# Patient Record
Sex: FEMALE | Race: WHITE | NOT HISPANIC OR LATINO
[De-identification: names, ages, dates, MRNs, and addresses within clinical notes are randomized per-mention and may not be internally consistent; named-entity substitution may affect disease eponyms.]

---

## 2017-05-02 ENCOUNTER — APPOINTMENT (OUTPATIENT)
Dept: VASCULAR SURGERY | Facility: CLINIC | Age: 37
End: 2017-05-02

## 2017-05-02 PROBLEM — Z00.00 ENCOUNTER FOR PREVENTIVE HEALTH EXAMINATION: Status: ACTIVE | Noted: 2017-05-02

## 2017-05-18 ENCOUNTER — RESULT REVIEW (OUTPATIENT)
Age: 37
End: 2017-05-18

## 2017-05-18 ENCOUNTER — APPOINTMENT (OUTPATIENT)
Dept: VASCULAR SURGERY | Facility: CLINIC | Age: 37
End: 2017-05-18

## 2017-06-02 ENCOUNTER — APPOINTMENT (OUTPATIENT)
Dept: VASCULAR SURGERY | Facility: CLINIC | Age: 37
End: 2017-06-02

## 2017-07-17 ENCOUNTER — APPOINTMENT (OUTPATIENT)
Dept: VASCULAR SURGERY | Facility: CLINIC | Age: 37
End: 2017-07-17

## 2017-11-29 ENCOUNTER — RECORD ABSTRACTING (OUTPATIENT)
Age: 37
End: 2017-11-29

## 2017-11-29 DIAGNOSIS — R25.2 CRAMP AND SPASM: ICD-10-CM

## 2017-11-29 DIAGNOSIS — I78.1 NEVUS, NON-NEOPLASTIC: ICD-10-CM

## 2017-11-29 DIAGNOSIS — M79.604 PAIN IN RIGHT LEG: ICD-10-CM

## 2017-11-29 DIAGNOSIS — I83.11 VARICOSE VEINS OF RIGHT LOWER EXTREMITY WITH INFLAMMATION: ICD-10-CM

## 2017-11-29 DIAGNOSIS — I87.2 VENOUS INSUFFICIENCY (CHRONIC) (PERIPHERAL): ICD-10-CM

## 2017-11-29 DIAGNOSIS — Z82.49 FAMILY HISTORY OF ISCHEMIC HEART DISEASE AND OTHER DISEASES OF THE CIRCULATORY SYSTEM: ICD-10-CM

## 2017-11-29 DIAGNOSIS — Z83.79 FAMILY HISTORY OF OTHER DISEASES OF THE DIGESTIVE SYSTEM: ICD-10-CM

## 2017-11-29 DIAGNOSIS — Z80.0 FAMILY HISTORY OF MALIGNANT NEOPLASM OF DIGESTIVE ORGANS: ICD-10-CM

## 2017-11-29 DIAGNOSIS — M79.89 OTHER SPECIFIED SOFT TISSUE DISORDERS: ICD-10-CM

## 2017-11-29 DIAGNOSIS — Z87.891 PERSONAL HISTORY OF NICOTINE DEPENDENCE: ICD-10-CM

## 2018-04-11 ENCOUNTER — APPOINTMENT (OUTPATIENT)
Dept: VASCULAR SURGERY | Facility: CLINIC | Age: 38
End: 2018-04-11

## 2018-07-23 PROBLEM — Z80.0 FAMILY HISTORY OF COLON CANCER: Status: ACTIVE | Noted: 2017-11-29

## 2018-07-23 PROBLEM — I87.2 VENOUS INSUFFICIENCY: Status: ACTIVE | Noted: 2017-11-29

## 2019-04-17 ENCOUNTER — EMERGENCY (EMERGENCY)
Facility: HOSPITAL | Age: 39
LOS: 1 days | Discharge: HOME | End: 2019-04-17
Attending: EMERGENCY MEDICINE | Admitting: EMERGENCY MEDICINE
Payer: MEDICAID

## 2019-04-17 VITALS
RESPIRATION RATE: 18 BRPM | SYSTOLIC BLOOD PRESSURE: 125 MMHG | WEIGHT: 208.78 LBS | OXYGEN SATURATION: 100 % | TEMPERATURE: 98 F | DIASTOLIC BLOOD PRESSURE: 88 MMHG | HEART RATE: 76 BPM | HEIGHT: 65 IN

## 2019-04-17 VITALS
HEART RATE: 56 BPM | TEMPERATURE: 98 F | OXYGEN SATURATION: 100 % | RESPIRATION RATE: 18 BRPM | DIASTOLIC BLOOD PRESSURE: 75 MMHG | SYSTOLIC BLOOD PRESSURE: 110 MMHG

## 2019-04-17 DIAGNOSIS — M54.5 LOW BACK PAIN: ICD-10-CM

## 2019-04-17 DIAGNOSIS — Z88.0 ALLERGY STATUS TO PENICILLIN: ICD-10-CM

## 2019-04-17 LAB
ALBUMIN SERPL ELPH-MCNC: 3.8 G/DL — SIGNIFICANT CHANGE UP (ref 3.3–5)
ALP SERPL-CCNC: SIGNIFICANT CHANGE UP U/L (ref 40–120)
ALT FLD-CCNC: SIGNIFICANT CHANGE UP U/L (ref 10–45)
ANION GAP SERPL CALC-SCNC: 10 MMOL/L — SIGNIFICANT CHANGE UP (ref 5–17)
APPEARANCE UR: CLEAR — SIGNIFICANT CHANGE UP
APTT BLD: 33.8 SEC — SIGNIFICANT CHANGE UP (ref 27.5–36.3)
AST SERPL-CCNC: SIGNIFICANT CHANGE UP U/L (ref 10–40)
BASOPHILS # BLD AUTO: 0.04 K/UL — SIGNIFICANT CHANGE UP (ref 0–0.2)
BASOPHILS NFR BLD AUTO: 0.6 % — SIGNIFICANT CHANGE UP (ref 0–2)
BILIRUB SERPL-MCNC: 0.3 MG/DL — SIGNIFICANT CHANGE UP (ref 0.2–1.2)
BILIRUB UR-MCNC: NEGATIVE — SIGNIFICANT CHANGE UP
BUN SERPL-MCNC: 9 MG/DL — SIGNIFICANT CHANGE UP (ref 7–23)
CALCIUM SERPL-MCNC: 9.2 MG/DL — SIGNIFICANT CHANGE UP (ref 8.4–10.5)
CHLORIDE SERPL-SCNC: 104 MMOL/L — SIGNIFICANT CHANGE UP (ref 96–108)
CK MB CFR SERPL CALC: <1 NG/ML — SIGNIFICANT CHANGE UP (ref 0–6.7)
CO2 SERPL-SCNC: 22 MMOL/L — SIGNIFICANT CHANGE UP (ref 22–31)
COLOR SPEC: YELLOW — SIGNIFICANT CHANGE UP
CREAT SERPL-MCNC: 0.63 MG/DL — SIGNIFICANT CHANGE UP (ref 0.5–1.3)
DIFF PNL FLD: NEGATIVE — SIGNIFICANT CHANGE UP
EOSINOPHIL # BLD AUTO: 0.15 K/UL — SIGNIFICANT CHANGE UP (ref 0–0.5)
EOSINOPHIL NFR BLD AUTO: 2.1 % — SIGNIFICANT CHANGE UP (ref 0–6)
GLUCOSE SERPL-MCNC: 99 MG/DL — SIGNIFICANT CHANGE UP (ref 70–99)
GLUCOSE UR QL: NEGATIVE — SIGNIFICANT CHANGE UP
HCT VFR BLD CALC: 46.2 % — HIGH (ref 34.5–45)
HGB BLD-MCNC: 14.5 G/DL — SIGNIFICANT CHANGE UP (ref 11.5–15.5)
IMM GRANULOCYTES NFR BLD AUTO: 0.3 % — SIGNIFICANT CHANGE UP (ref 0–1.5)
INR BLD: 1.06 — SIGNIFICANT CHANGE UP (ref 0.88–1.16)
KETONES UR-MCNC: NEGATIVE — SIGNIFICANT CHANGE UP
LEUKOCYTE ESTERASE UR-ACNC: NEGATIVE — SIGNIFICANT CHANGE UP
LYMPHOCYTES # BLD AUTO: 2.31 K/UL — SIGNIFICANT CHANGE UP (ref 1–3.3)
LYMPHOCYTES # BLD AUTO: 33 % — SIGNIFICANT CHANGE UP (ref 13–44)
MCHC RBC-ENTMCNC: 26.1 PG — LOW (ref 27–34)
MCHC RBC-ENTMCNC: 31.4 GM/DL — LOW (ref 32–36)
MCV RBC AUTO: 83.2 FL — SIGNIFICANT CHANGE UP (ref 80–100)
MONOCYTES # BLD AUTO: 0.52 K/UL — SIGNIFICANT CHANGE UP (ref 0–0.9)
MONOCYTES NFR BLD AUTO: 7.4 % — SIGNIFICANT CHANGE UP (ref 2–14)
NEUTROPHILS # BLD AUTO: 3.97 K/UL — SIGNIFICANT CHANGE UP (ref 1.8–7.4)
NEUTROPHILS NFR BLD AUTO: 56.6 % — SIGNIFICANT CHANGE UP (ref 43–77)
NITRITE UR-MCNC: NEGATIVE — SIGNIFICANT CHANGE UP
NRBC # BLD: 0 /100 WBCS — SIGNIFICANT CHANGE UP (ref 0–0)
PH UR: 6.5 — SIGNIFICANT CHANGE UP (ref 5–8)
PLATELET # BLD AUTO: 235 K/UL — SIGNIFICANT CHANGE UP (ref 150–400)
POTASSIUM SERPL-MCNC: SIGNIFICANT CHANGE UP MMOL/L (ref 3.5–5.3)
POTASSIUM SERPL-SCNC: SIGNIFICANT CHANGE UP MMOL/L (ref 3.5–5.3)
PROT SERPL-MCNC: 7.6 G/DL — SIGNIFICANT CHANGE UP (ref 6–8.3)
PROT UR-MCNC: NEGATIVE MG/DL — SIGNIFICANT CHANGE UP
PROTHROM AB SERPL-ACNC: 12 SEC — SIGNIFICANT CHANGE UP (ref 10–12.9)
RBC # BLD: 5.55 M/UL — HIGH (ref 3.8–5.2)
RBC # FLD: 13.2 % — SIGNIFICANT CHANGE UP (ref 10.3–14.5)
SODIUM SERPL-SCNC: 136 MMOL/L — SIGNIFICANT CHANGE UP (ref 135–145)
SP GR SPEC: 1.01 — SIGNIFICANT CHANGE UP (ref 1–1.03)
TROPONIN T SERPL-MCNC: <0.01 NG/ML — SIGNIFICANT CHANGE UP (ref 0–0.01)
UROBILINOGEN FLD QL: 0.2 E.U./DL — SIGNIFICANT CHANGE UP
WBC # BLD: 7.01 K/UL — SIGNIFICANT CHANGE UP (ref 3.8–10.5)
WBC # FLD AUTO: 7.01 K/UL — SIGNIFICANT CHANGE UP (ref 3.8–10.5)

## 2019-04-17 PROCEDURE — 85025 COMPLETE CBC W/AUTO DIFF WBC: CPT

## 2019-04-17 PROCEDURE — 81003 URINALYSIS AUTO W/O SCOPE: CPT

## 2019-04-17 PROCEDURE — 85610 PROTHROMBIN TIME: CPT

## 2019-04-17 PROCEDURE — 87086 URINE CULTURE/COLONY COUNT: CPT

## 2019-04-17 PROCEDURE — 80053 COMPREHEN METABOLIC PANEL: CPT

## 2019-04-17 PROCEDURE — 82550 ASSAY OF CK (CPK): CPT

## 2019-04-17 PROCEDURE — 85730 THROMBOPLASTIN TIME PARTIAL: CPT

## 2019-04-17 PROCEDURE — 96372 THER/PROPH/DIAG INJ SC/IM: CPT

## 2019-04-17 PROCEDURE — 93010 ELECTROCARDIOGRAM REPORT: CPT

## 2019-04-17 PROCEDURE — 71046 X-RAY EXAM CHEST 2 VIEWS: CPT

## 2019-04-17 PROCEDURE — 99284 EMERGENCY DEPT VISIT MOD MDM: CPT | Mod: 25

## 2019-04-17 PROCEDURE — 82553 CREATINE MB FRACTION: CPT

## 2019-04-17 PROCEDURE — 93005 ELECTROCARDIOGRAM TRACING: CPT

## 2019-04-17 PROCEDURE — 84484 ASSAY OF TROPONIN QUANT: CPT

## 2019-04-17 PROCEDURE — 99285 EMERGENCY DEPT VISIT HI MDM: CPT | Mod: 25

## 2019-04-17 PROCEDURE — 71046 X-RAY EXAM CHEST 2 VIEWS: CPT | Mod: 26

## 2019-04-17 RX ORDER — DIAZEPAM 5 MG
5 TABLET ORAL ONCE
Qty: 0 | Refills: 0 | Status: DISCONTINUED | OUTPATIENT
Start: 2019-04-17 | End: 2019-04-17

## 2019-04-17 RX ORDER — KETOROLAC TROMETHAMINE 30 MG/ML
30 SYRINGE (ML) INJECTION ONCE
Qty: 0 | Refills: 0 | Status: DISCONTINUED | OUTPATIENT
Start: 2019-04-17 | End: 2019-04-17

## 2019-04-17 RX ORDER — METHOCARBAMOL 500 MG/1
2 TABLET, FILM COATED ORAL
Qty: 24 | Refills: 0
Start: 2019-04-17 | End: 2019-04-19

## 2019-04-17 RX ORDER — IBUPROFEN 200 MG
1 TABLET ORAL
Qty: 15 | Refills: 0
Start: 2019-04-17 | End: 2019-04-21

## 2019-04-17 RX ORDER — LIDOCAINE 4 G/100G
2 CREAM TOPICAL ONCE
Qty: 0 | Refills: 0 | Status: COMPLETED | OUTPATIENT
Start: 2019-04-17 | End: 2019-04-17

## 2019-04-17 RX ADMIN — Medication 30 MILLIGRAM(S): at 10:55

## 2019-04-17 RX ADMIN — Medication 5 MILLIGRAM(S): at 10:55

## 2019-04-17 NOTE — ED PROVIDER NOTE - ATTENDING CONTRIBUTION TO CARE
37 yo female no pmh c/o back pain upper back radiating to neck and scapular area x 8 d, worse w movement and laying on R side, notes numbness in RUE when she lays in bed at night that improves if she doesn't sleep on R side.  Pt denies other numbness or weakness, cp.  Pt also felt LLQ pain and hematuria 3 d ago that resolved - no longer present.  No h/o stones, abnl discharge, dysuria w sx.  Pt took old clindamycin when she had the LLQ pain.   No trauma, cp, palpitations, sweats, sob, n/v, dizziness, cough, fever, vaginal bleeding, discharge. Pt is sexually active with 1 partner.  No h/o std.  Well appearing, nad, nc/at, lung cta, heart reg, abd soft, nt, neck/back w ttp t2/3 area w/o step off or fluctuance, also mild ttp R trapezius/rhomboid area, ext no gross deformity, radial and dp 1+ bilat, awake, alert, oriented x 3, CN II-XII grossly intact, motor 5/5, no gross sens deficits, gait steady, no ataxia, speech clear.  Pt w resolved abd pain and hematuria - ua nl here, abd nontender; ? passed stone - discussed abd pain precautions.  Pt also c/o back pain w/o neuro deficits.  No fever, sig ttp or neuro finding on exam to suggest epidural abscess.  ? radiculopathy.  Pt felt improved w pain meds in ed.  Plan dc to fu pmd and outpt mri c/t spine, discussed reasons for return w pt for worsening neuro sx, fever, any other concerns.

## 2019-04-17 NOTE — ED PROVIDER NOTE - CARE PROVIDER_API CALL
Jimmy Mccurdy)  Obstetrics and Gynecology  [address] [address]  Phone: [phone]  Fax: [phone]  Follow Up Time:     Amos Sweeney)  Medicine  [address], [address]  [place] [address]  Phone: [phone]  Fax: [phone]  Follow Up Time:     Brian Irene)  Med Specialties [address]  [address] [place] [address]  Phone: [phone]  Fax: [phone]  Follow Up Time:

## 2019-04-17 NOTE — ED PROVIDER NOTE - PHYSICAL EXAMINATION
CONSTITUTIONAL: Well-appearing; well-nourished; in no apparent distress.   HEAD: Normocephalic; atraumatic.   EYES: PERRL; EOM intact; conjunctiva and sclera clear  ENT: normal nose; no rhinorrhea; normal pharynx with no erythema or lesions.   NECK: Supple; +tenderness to R cervical, thoracic and lumbar paraspinal muscles. Pt holding the R side of her neck.   CARDIOVASCULAR: Normal S1, S2; no murmurs, rubs, or gallops. Regular rate and rhythm.   RESPIRATORY: Breathing easily; breath sounds clear and equal bilaterally; no wheezes, rhonchi, or rales.  GI: Soft; non-distended; non-tender; no palpable organomegaly.   : no cmt or adnexal tenderness   MSK: FROM at all extremities, normal tone   EXT: No cyanosis or edema; N/V intact  SKIN: Normal for age and race; warm; dry; good turgor; no apparent lesions or rash.   NEURO: A & O x 3; face symmetric; grossly unremarkable.   PSYCHOLOGICAL: The patient’s mood and manner are appropriate.

## 2019-04-17 NOTE — ED PROVIDER NOTE - CARE PROVIDERS DIRECT ADDRESSES
[email or web address]

## 2019-04-17 NOTE — ED ADULT TRIAGE NOTE - CHIEF COMPLAINT QUOTE
Patient c/o upper back pain radiating to chest for 10 days got worse last night .  Also c/o blood in the urine , vaginal pressure when urinating , fever and chills since last night .

## 2019-04-17 NOTE — ED PROVIDER NOTE - PROVIDER TOKENS
PROVIDER:[TOKEN:[[id number]]],PROVIDER:[TOKEN:[[id number]]],PROVIDER:[TOKEN:[[id number]]]

## 2019-04-18 LAB
CULTURE RESULTS: SIGNIFICANT CHANGE UP
SPECIMEN SOURCE: SIGNIFICANT CHANGE UP

## 2019-08-17 ENCOUNTER — EMERGENCY (EMERGENCY)
Facility: HOSPITAL | Age: 39
LOS: 1 days | Discharge: HOME | End: 2019-08-17
Attending: EMERGENCY MEDICINE | Admitting: EMERGENCY MEDICINE
Payer: MEDICAID

## 2019-08-17 ENCOUNTER — INBOUND DOCUMENT (OUTPATIENT)
Age: 39
End: 2019-08-17

## 2019-08-17 VITALS
OXYGEN SATURATION: 99 % | TEMPERATURE: 99 F | HEIGHT: 66 IN | RESPIRATION RATE: 18 BRPM | DIASTOLIC BLOOD PRESSURE: 85 MMHG | SYSTOLIC BLOOD PRESSURE: 128 MMHG | WEIGHT: 203.93 LBS | HEART RATE: 82 BPM

## 2019-08-17 LAB
ALBUMIN SERPL ELPH-MCNC: 4 G/DL — SIGNIFICANT CHANGE UP (ref 3.3–5)
ALP SERPL-CCNC: 52 U/L — SIGNIFICANT CHANGE UP (ref 40–120)
ALT FLD-CCNC: 9 U/L — LOW (ref 10–45)
ANION GAP SERPL CALC-SCNC: 11 MMOL/L — SIGNIFICANT CHANGE UP (ref 5–17)
APTT BLD: 33.6 SEC — SIGNIFICANT CHANGE UP (ref 27.5–36.3)
AST SERPL-CCNC: 12 U/L — SIGNIFICANT CHANGE UP (ref 10–40)
BASOPHILS # BLD AUTO: 0.05 K/UL — SIGNIFICANT CHANGE UP (ref 0–0.2)
BASOPHILS NFR BLD AUTO: 0.6 % — SIGNIFICANT CHANGE UP (ref 0–2)
BILIRUB SERPL-MCNC: 0.3 MG/DL — SIGNIFICANT CHANGE UP (ref 0.2–1.2)
BUN SERPL-MCNC: 9 MG/DL — SIGNIFICANT CHANGE UP (ref 7–23)
CALCIUM SERPL-MCNC: 9.5 MG/DL — SIGNIFICANT CHANGE UP (ref 8.4–10.5)
CHLORIDE SERPL-SCNC: 105 MMOL/L — SIGNIFICANT CHANGE UP (ref 96–108)
CK MB CFR SERPL CALC: <1 NG/ML — SIGNIFICANT CHANGE UP (ref 0–6.7)
CO2 SERPL-SCNC: 24 MMOL/L — SIGNIFICANT CHANGE UP (ref 22–31)
CREAT SERPL-MCNC: 0.74 MG/DL — SIGNIFICANT CHANGE UP (ref 0.5–1.3)
EOSINOPHIL # BLD AUTO: 0.19 K/UL — SIGNIFICANT CHANGE UP (ref 0–0.5)
EOSINOPHIL NFR BLD AUTO: 2.4 % — SIGNIFICANT CHANGE UP (ref 0–6)
GLUCOSE SERPL-MCNC: 103 MG/DL — HIGH (ref 70–99)
HCT VFR BLD CALC: 44.8 % — SIGNIFICANT CHANGE UP (ref 34.5–45)
HGB BLD-MCNC: 14.1 G/DL — SIGNIFICANT CHANGE UP (ref 11.5–15.5)
IMM GRANULOCYTES NFR BLD AUTO: 0.5 % — SIGNIFICANT CHANGE UP (ref 0–1.5)
INR BLD: 1.12 — SIGNIFICANT CHANGE UP (ref 0.88–1.16)
LYMPHOCYTES # BLD AUTO: 2.85 K/UL — SIGNIFICANT CHANGE UP (ref 1–3.3)
LYMPHOCYTES # BLD AUTO: 36.4 % — SIGNIFICANT CHANGE UP (ref 13–44)
MCHC RBC-ENTMCNC: 26 PG — LOW (ref 27–34)
MCHC RBC-ENTMCNC: 31.5 GM/DL — LOW (ref 32–36)
MCV RBC AUTO: 82.5 FL — SIGNIFICANT CHANGE UP (ref 80–100)
MONOCYTES # BLD AUTO: 0.49 K/UL — SIGNIFICANT CHANGE UP (ref 0–0.9)
MONOCYTES NFR BLD AUTO: 6.3 % — SIGNIFICANT CHANGE UP (ref 2–14)
NEUTROPHILS # BLD AUTO: 4.21 K/UL — SIGNIFICANT CHANGE UP (ref 1.8–7.4)
NEUTROPHILS NFR BLD AUTO: 53.8 % — SIGNIFICANT CHANGE UP (ref 43–77)
NRBC # BLD: 0 /100 WBCS — SIGNIFICANT CHANGE UP (ref 0–0)
PLATELET # BLD AUTO: 246 K/UL — SIGNIFICANT CHANGE UP (ref 150–400)
POTASSIUM SERPL-MCNC: 3.9 MMOL/L — SIGNIFICANT CHANGE UP (ref 3.5–5.3)
POTASSIUM SERPL-SCNC: 3.9 MMOL/L — SIGNIFICANT CHANGE UP (ref 3.5–5.3)
PROT SERPL-MCNC: 7.5 G/DL — SIGNIFICANT CHANGE UP (ref 6–8.3)
PROTHROM AB SERPL-ACNC: 12.7 SEC — SIGNIFICANT CHANGE UP (ref 10–12.9)
RBC # BLD: 5.43 M/UL — HIGH (ref 3.8–5.2)
RBC # FLD: 13.2 % — SIGNIFICANT CHANGE UP (ref 10.3–14.5)
SODIUM SERPL-SCNC: 140 MMOL/L — SIGNIFICANT CHANGE UP (ref 135–145)
TROPONIN T SERPL-MCNC: <0.01 NG/ML — SIGNIFICANT CHANGE UP (ref 0–0.01)
WBC # BLD: 7.83 K/UL — SIGNIFICANT CHANGE UP (ref 3.8–10.5)
WBC # FLD AUTO: 7.83 K/UL — SIGNIFICANT CHANGE UP (ref 3.8–10.5)

## 2019-08-17 PROCEDURE — 71046 X-RAY EXAM CHEST 2 VIEWS: CPT | Mod: 26

## 2019-08-17 PROCEDURE — 85610 PROTHROMBIN TIME: CPT

## 2019-08-17 PROCEDURE — 71046 X-RAY EXAM CHEST 2 VIEWS: CPT

## 2019-08-17 PROCEDURE — 36415 COLL VENOUS BLD VENIPUNCTURE: CPT

## 2019-08-17 PROCEDURE — 84484 ASSAY OF TROPONIN QUANT: CPT

## 2019-08-17 PROCEDURE — 96374 THER/PROPH/DIAG INJ IV PUSH: CPT

## 2019-08-17 PROCEDURE — 99285 EMERGENCY DEPT VISIT HI MDM: CPT | Mod: 25

## 2019-08-17 PROCEDURE — 80053 COMPREHEN METABOLIC PANEL: CPT

## 2019-08-17 PROCEDURE — 85025 COMPLETE CBC W/AUTO DIFF WBC: CPT

## 2019-08-17 PROCEDURE — 96375 TX/PRO/DX INJ NEW DRUG ADDON: CPT

## 2019-08-17 PROCEDURE — 82550 ASSAY OF CK (CPK): CPT

## 2019-08-17 PROCEDURE — 82553 CREATINE MB FRACTION: CPT

## 2019-08-17 PROCEDURE — 99284 EMERGENCY DEPT VISIT MOD MDM: CPT | Mod: 25

## 2019-08-17 PROCEDURE — 85730 THROMBOPLASTIN TIME PARTIAL: CPT

## 2019-08-17 RX ORDER — FAMOTIDINE 10 MG/ML
20 INJECTION INTRAVENOUS ONCE
Refills: 0 | Status: COMPLETED | OUTPATIENT
Start: 2019-08-17 | End: 2019-08-17

## 2019-08-17 RX ORDER — ACETAMINOPHEN 500 MG
650 TABLET ORAL ONCE
Refills: 0 | Status: COMPLETED | OUTPATIENT
Start: 2019-08-17 | End: 2019-08-17

## 2019-08-17 RX ORDER — METOCLOPRAMIDE HCL 10 MG
10 TABLET ORAL ONCE
Refills: 0 | Status: COMPLETED | OUTPATIENT
Start: 2019-08-17 | End: 2019-08-17

## 2019-08-17 RX ADMIN — Medication 104 MILLIGRAM(S): at 17:35

## 2019-08-17 RX ADMIN — FAMOTIDINE 20 MILLIGRAM(S): 10 INJECTION INTRAVENOUS at 17:35

## 2019-08-17 RX ADMIN — Medication 650 MILLIGRAM(S): at 17:35

## 2019-08-17 NOTE — ED PROVIDER NOTE - DIAGNOSTIC INTERPRETATION
ER PA: Lucina Medrano, PAC  CHEST XRAY INTERPRETATION: lungs clear, heart shadow normal, bony structures intact

## 2019-08-17 NOTE — ED PROVIDER NOTE - PHYSICAL EXAMINATION
CONSTITUTIONAL: Well-appearing; well-nourished; in no apparent distress.   HEAD: Normocephalic; atraumatic.   EYES: PERRL; EOM intact; conjunctiva and sclera clear  ENT: normal nose; no rhinorrhea; normal pharynx with no erythema or lesions.   NECK: Supple; non-tender; no LAD  CARDIOVASCULAR: Normal S1, S2; no murmurs, rubs, or gallops. Regular rate and rhythm. Tenderness to palpations to right of chest wall.  RESPIRATORY: Breathing easily; breath sounds clear and equal bilaterally; no wheezes, rhonchi, or rales.  GI: Soft; non-distended; non-tender; no palpable organomegaly.   MSK: FROM at all extremities, normal tone   EXT: No cyanosis or edema; N/V intact  SKIN: Normal for age and race; warm; dry; good turgor; no apparent lesions or rash.   NEURO: A & O x 3; face symmetric; grossly unremarkable.   PSYCHOLOGICAL: The patient’s mood and manner are appropriate.

## 2019-08-17 NOTE — ED PROVIDER NOTE - CLINICAL SUMMARY MEDICAL DECISION MAKING FREE TEXT BOX
38 y/o F smoker with PMHx of asthma, hypothyroidism, GERD, migraines, and positive family history of CA presents to the ED with complaints of intermittent right sided chest pain x3 days, with associated lightheadedness, SOB and nausea. Denies vomiting or any focal weaknesses. Vital signs are stable and pt is well-appearing. EKG shows NSR with no ischemic changes. Neck is supple, neurologically intact. Heart is regular rate and rhythm. Lungs are clear to ascultation b/l. Chest pain is reproducible with palpation to right chest wall. Given risk factors, will get 1 set of Troponins given pain has been going on for 3 days. Will treat headache. If normal, refer pt to cardiology for prompt f/o next week.

## 2019-08-17 NOTE — ED ADULT TRIAGE NOTE - CHIEF COMPLAINT QUOTE
4 days migraine HA and lightheadedness, onset of R sided CP worse with movement and SOB last night, also c/o "acid reflux" x1 month; no f/c, no vomiting

## 2019-08-17 NOTE — ED PROVIDER NOTE - ATTENDING CONTRIBUTION TO CARE
right parasternal anterior chest wall pain.  suspect costochondritis.  per neg for pe.  ecg non ischemic.  trop neg.  cxr neg.  plan nsaids.

## 2019-08-17 NOTE — ED ADULT NURSE NOTE - OBJECTIVE STATEMENT
Pt to ER w/ report of hx of acid reflux but worsening discomfort for past week, w/ R-sided chest discomfort worse with movement, sob, and HA.  Pt denies f/c/v.  Breathing unlabored, skin warm and dry. 12 lead ekg done and shown to ER attending physician.  Will continue to monitor.

## 2019-08-17 NOTE — ED PROVIDER NOTE - CARE PROVIDER_API CALL
Judith Reyna)  Cardiovascular Disease; Internal Medicine  [address] [id number]  Phone: [phone]  Fax: [phone]  Follow Up Time:

## 2019-08-17 NOTE — ED ADULT TRIAGE NOTE - OTHER COMPLAINTS
Hx asthma and thyroid disease, states "I haven't been on medications in 3 years but I also haven't had it checked"

## 2019-08-17 NOTE — ED PROVIDER NOTE - CHPI ED SYMPTOMS NEG
no chills/no vomiting/no palpitations, no swelling in legs, no numbness, no tingling, no visual changes/no fever/no cough

## 2019-08-17 NOTE — ED PROVIDER NOTE - NS ED ROS FT
Constitutional: no fever, chills  Cardiac: right sided chest   Resp: SOB  GI: nausea, GERD  Neuro: lightheadedness, headaches  All other ROS neg except as per HPI

## 2019-08-17 NOTE — ED PROVIDER NOTE - OBJECTIVE STATEMENT
38 y/o F with PMHx of asthma, hypothyroidism, GERD and migraines presents to the ED with complaints of right sided chest pain x3 days. Pt states that pain was initially intermittent, but has become constant over the past 2 days. The pain is described as cramping and pressure-like, aggravated with movement. Pt notes associated SOB, lightheadedness, and nausea. Denies fever, chills, sweats, cough, palpitations, vomiting, and swelling in legs.   Pt also has complaints of intermittent frontal headaches x3 days, gradual in onset and improved with Tylenol. Pt states that symptoms are similar to previous headaches in the past. Denies numbness, tingling, visual changes, eye tearing, focal weakness or trauma. Pt admits that she is a smoker, and has a strong family hx of cardiac disease. Pt's mother had a MI at 46, and father had a MI at 52. However, pt has never had a cardiac workup of her own.  Pt also has complaints regarding her GERD. Pt states that she has had a burning sensation, with food feeling like it gets stuck x1.5 months. Pt has tried multiple OTC medications. Pt is most concerned about chest pain today however.

## 2019-08-20 PROBLEM — E03.9 HYPOTHYROIDISM, UNSPECIFIED: Chronic | Status: ACTIVE | Noted: 2019-08-17

## 2019-08-20 PROBLEM — J45.909 UNSPECIFIED ASTHMA, UNCOMPLICATED: Chronic | Status: ACTIVE | Noted: 2019-08-17

## 2019-08-20 PROBLEM — G43.909 MIGRAINE, UNSPECIFIED, NOT INTRACTABLE, WITHOUT STATUS MIGRAINOSUS: Chronic | Status: ACTIVE | Noted: 2019-08-17

## 2019-08-20 PROBLEM — K21.9 GASTRO-ESOPHAGEAL REFLUX DISEASE WITHOUT ESOPHAGITIS: Chronic | Status: ACTIVE | Noted: 2019-08-17

## 2019-08-21 DIAGNOSIS — R11.0 NAUSEA: ICD-10-CM

## 2019-08-21 DIAGNOSIS — R51 HEADACHE: ICD-10-CM

## 2019-08-21 DIAGNOSIS — R42 DIZZINESS AND GIDDINESS: ICD-10-CM

## 2019-08-21 DIAGNOSIS — R06.02 SHORTNESS OF BREATH: ICD-10-CM

## 2019-08-21 DIAGNOSIS — R07.89 OTHER CHEST PAIN: ICD-10-CM

## 2019-08-26 ENCOUNTER — APPOINTMENT (OUTPATIENT)
Dept: HEART AND VASCULAR | Facility: CLINIC | Age: 39
End: 2019-08-26
Payer: MEDICAID

## 2019-08-26 VITALS
OXYGEN SATURATION: 98 % | DIASTOLIC BLOOD PRESSURE: 82 MMHG | SYSTOLIC BLOOD PRESSURE: 118 MMHG | BODY MASS INDEX: 32.69 KG/M2 | HEIGHT: 65.75 IN | WEIGHT: 201 LBS | TEMPERATURE: 98.2 F | HEART RATE: 72 BPM

## 2019-08-26 PROCEDURE — 99406 BEHAV CHNG SMOKING 3-10 MIN: CPT

## 2019-08-26 PROCEDURE — 93000 ELECTROCARDIOGRAM COMPLETE: CPT

## 2019-08-26 PROCEDURE — 99204 OFFICE O/P NEW MOD 45 MIN: CPT | Mod: 25

## 2019-08-27 NOTE — DISCUSSION/SUMMARY
[FreeTextEntry1] : The number of diagnostic and/or management options include:\par CAD, HTN, HPL, CV Prevention\par \par CAD - sob massey smoker will get cardiopulm stress test to eval for cad sbp response ekg changes VO2 max and PFT response\par \par HTN - at goal, will check 2d echo for DD and bp response to exercise\par \par \par Labs, radiology: ekg\par \par Aspirin therapy:\par \par LDL: n/a\par \par High Complexity Medical Decision Making\par \par Smoking and tobacco use cessation counseling visit (4min)\par Patient was competent and alert at the time of the counseling provided. Will reinforce subsequent visit.\par ·	The patient’s tobacco use - less than 1 ppd\par ·	Advised to quit and impact of smoking - including increased CAD PAD and mortality \par ·	Assessed willingness to attempt to quit - pre-contemplation stage\par ·	Providing methods and skills for cessation - nicotine replacement, cbt therapy\par ·	Medication management of smoking session drugs - n/a\par ·	Resources provided - group counseling, individual counseling, cbt therapy\par ·	Setting quit date - not ready to commit to a date              \par ·	Follow-up arranged - next scheduled visit\par  \par

## 2019-08-27 NOTE — PHYSICAL EXAM
[General Appearance - Well Developed] : well developed [Well Groomed] : well groomed [Normal Appearance] : normal appearance [No Deformities] : no deformities [General Appearance - Well Nourished] : well nourished [Normal Conjunctiva] : the conjunctiva exhibited no abnormalities [General Appearance - In No Acute Distress] : no acute distress [Eyelids - No Xanthelasma] : the eyelids demonstrated no xanthelasmas [Normal Oral Mucosa] : normal oral mucosa [No Oral Cyanosis] : no oral cyanosis [No Oral Pallor] : no oral pallor [Normal Jugular Venous A Waves Present] : normal jugular venous A waves present [Normal Jugular Venous V Waves Present] : normal jugular venous V waves present [No Jugular Venous Nava A Waves] : no jugular venous nava A waves [Heart Rate And Rhythm] : heart rate and rhythm were normal [Murmurs] : no murmurs present [Heart Sounds] : normal S1 and S2 [Exaggerated Use Of Accessory Muscles For Inspiration] : no accessory muscle use [Respiration, Rhythm And Depth] : normal respiratory rhythm and effort [Abdomen Soft] : soft [Auscultation Breath Sounds / Voice Sounds] : lungs were clear to auscultation bilaterally [Abdomen Tenderness] : non-tender [Gait - Sufficient For Exercise Testing] : the gait was sufficient for exercise testing [Abdomen Mass (___ Cm)] : no abdominal mass palpated [Abnormal Walk] : normal gait [Nail Clubbing] : no clubbing of the fingernails [Cyanosis, Localized] : no localized cyanosis [Petechial Hemorrhages (___cm)] : no petechial hemorrhages [Skin Color & Pigmentation] : normal skin color and pigmentation [] : no rash [No Venous Stasis] : no venous stasis [Skin Lesions] : no skin lesions [No Skin Ulcers] : no skin ulcer [No Xanthoma] : no  xanthoma was observed [Affect] : the affect was normal [Oriented To Time, Place, And Person] : oriented to person, place, and time [No Anxiety] : not feeling anxious [Mood] : the mood was normal

## 2019-08-27 NOTE — HISTORY OF PRESENT ILLNESS
[FreeTextEntry1] : \par 39 F smoker no known cardiac risk factors here for sob massey and left arm pain\par \par location: chest\par duration: exertional\par  modifying factors: rest\par timing: seconds\par severity: 09/10\par EKG NSR non specific st changes

## 2019-09-04 ENCOUNTER — APPOINTMENT (OUTPATIENT)
Dept: HEART AND VASCULAR | Facility: CLINIC | Age: 39
End: 2019-09-04
Payer: MEDICAID

## 2019-09-04 PROCEDURE — 93306 TTE W/DOPPLER COMPLETE: CPT

## 2019-09-05 ENCOUNTER — FORM ENCOUNTER (OUTPATIENT)
Age: 39
End: 2019-09-05

## 2019-09-06 ENCOUNTER — OUTPATIENT (OUTPATIENT)
Dept: OUTPATIENT SERVICES | Facility: HOSPITAL | Age: 39
LOS: 1 days | End: 2019-09-06
Payer: MEDICAID

## 2019-09-06 ENCOUNTER — APPOINTMENT (OUTPATIENT)
Dept: CT IMAGING | Facility: HOSPITAL | Age: 39
End: 2019-09-06
Payer: MEDICAID

## 2019-09-06 PROCEDURE — 75574 CT ANGIO HRT W/3D IMAGE: CPT

## 2019-09-06 PROCEDURE — 75574 CT ANGIO HRT W/3D IMAGE: CPT | Mod: 26

## 2019-09-16 ENCOUNTER — APPOINTMENT (OUTPATIENT)
Dept: HEART AND VASCULAR | Facility: CLINIC | Age: 39
End: 2019-09-16

## 2020-05-11 ENCOUNTER — TRANSCRIPTION ENCOUNTER (OUTPATIENT)
Age: 40
End: 2020-05-11

## 2020-05-12 ENCOUNTER — INPATIENT (INPATIENT)
Facility: HOSPITAL | Age: 40
LOS: 0 days | Discharge: HOME | DRG: 817 | End: 2020-05-12
Attending: OBSTETRICS & GYNECOLOGY | Admitting: OBSTETRICS & GYNECOLOGY
Payer: MEDICAID

## 2020-05-12 ENCOUNTER — RESULT REVIEW (OUTPATIENT)
Age: 40
End: 2020-05-12

## 2020-05-12 VITALS
SYSTOLIC BLOOD PRESSURE: 99 MMHG | HEART RATE: 63 BPM | OXYGEN SATURATION: 100 % | RESPIRATION RATE: 14 BRPM | DIASTOLIC BLOOD PRESSURE: 70 MMHG | TEMPERATURE: 98 F

## 2020-05-12 VITALS
HEIGHT: 67 IN | WEIGHT: 203.05 LBS | HEART RATE: 76 BPM | TEMPERATURE: 98 F | OXYGEN SATURATION: 100 % | RESPIRATION RATE: 17 BRPM | DIASTOLIC BLOOD PRESSURE: 82 MMHG | SYSTOLIC BLOOD PRESSURE: 104 MMHG

## 2020-05-12 LAB
ALBUMIN SERPL ELPH-MCNC: 3.7 G/DL — SIGNIFICANT CHANGE UP (ref 3.3–5)
ALP SERPL-CCNC: 48 U/L — SIGNIFICANT CHANGE UP (ref 40–120)
ALT FLD-CCNC: 8 U/L — LOW (ref 10–45)
ANION GAP SERPL CALC-SCNC: 12 MMOL/L — SIGNIFICANT CHANGE UP (ref 5–17)
APPEARANCE UR: CLEAR — SIGNIFICANT CHANGE UP
AST SERPL-CCNC: 11 U/L — SIGNIFICANT CHANGE UP (ref 10–40)
BASOPHILS # BLD AUTO: 0.04 K/UL — SIGNIFICANT CHANGE UP (ref 0–0.2)
BASOPHILS NFR BLD AUTO: 0.5 % — SIGNIFICANT CHANGE UP (ref 0–2)
BILIRUB SERPL-MCNC: 0.4 MG/DL — SIGNIFICANT CHANGE UP (ref 0.2–1.2)
BILIRUB UR-MCNC: NEGATIVE — SIGNIFICANT CHANGE UP
BLD GP AB SCN SERPL QL: NEGATIVE — SIGNIFICANT CHANGE UP
BUN SERPL-MCNC: 8 MG/DL — SIGNIFICANT CHANGE UP (ref 7–23)
CALCIUM SERPL-MCNC: 8.7 MG/DL — SIGNIFICANT CHANGE UP (ref 8.4–10.5)
CHLORIDE SERPL-SCNC: 107 MMOL/L — SIGNIFICANT CHANGE UP (ref 96–108)
CO2 SERPL-SCNC: 20 MMOL/L — LOW (ref 22–31)
COLOR SPEC: YELLOW — SIGNIFICANT CHANGE UP
CREAT SERPL-MCNC: 0.64 MG/DL — SIGNIFICANT CHANGE UP (ref 0.5–1.3)
DIFF PNL FLD: NEGATIVE — SIGNIFICANT CHANGE UP
EOSINOPHIL # BLD AUTO: 0.14 K/UL — SIGNIFICANT CHANGE UP (ref 0–0.5)
EOSINOPHIL NFR BLD AUTO: 1.8 % — SIGNIFICANT CHANGE UP (ref 0–6)
GLUCOSE SERPL-MCNC: 106 MG/DL — HIGH (ref 70–99)
GLUCOSE UR QL: NEGATIVE — SIGNIFICANT CHANGE UP
HCG SERPL-ACNC: HIGH MIU/ML
HCT VFR BLD CALC: 43 % — SIGNIFICANT CHANGE UP (ref 34.5–45)
HGB BLD-MCNC: 14 G/DL — SIGNIFICANT CHANGE UP (ref 11.5–15.5)
IMM GRANULOCYTES NFR BLD AUTO: 0.5 % — SIGNIFICANT CHANGE UP (ref 0–1.5)
KETONES UR-MCNC: NEGATIVE — SIGNIFICANT CHANGE UP
LEUKOCYTE ESTERASE UR-ACNC: NEGATIVE — SIGNIFICANT CHANGE UP
LIDOCAIN IGE QN: 23 U/L — SIGNIFICANT CHANGE UP (ref 7–60)
LYMPHOCYTES # BLD AUTO: 2.18 K/UL — SIGNIFICANT CHANGE UP (ref 1–3.3)
LYMPHOCYTES # BLD AUTO: 28.5 % — SIGNIFICANT CHANGE UP (ref 13–44)
MCHC RBC-ENTMCNC: 26.7 PG — LOW (ref 27–34)
MCHC RBC-ENTMCNC: 32.6 GM/DL — SIGNIFICANT CHANGE UP (ref 32–36)
MCV RBC AUTO: 82.1 FL — SIGNIFICANT CHANGE UP (ref 80–100)
MONOCYTES # BLD AUTO: 0.53 K/UL — SIGNIFICANT CHANGE UP (ref 0–0.9)
MONOCYTES NFR BLD AUTO: 6.9 % — SIGNIFICANT CHANGE UP (ref 2–14)
NEUTROPHILS # BLD AUTO: 4.72 K/UL — SIGNIFICANT CHANGE UP (ref 1.8–7.4)
NEUTROPHILS NFR BLD AUTO: 61.8 % — SIGNIFICANT CHANGE UP (ref 43–77)
NITRITE UR-MCNC: NEGATIVE — SIGNIFICANT CHANGE UP
NRBC # BLD: 0 /100 WBCS — SIGNIFICANT CHANGE UP (ref 0–0)
PH UR: 6 — SIGNIFICANT CHANGE UP (ref 5–8)
PLATELET # BLD AUTO: 227 K/UL — SIGNIFICANT CHANGE UP (ref 150–400)
POTASSIUM SERPL-MCNC: 3.8 MMOL/L — SIGNIFICANT CHANGE UP (ref 3.5–5.3)
POTASSIUM SERPL-SCNC: 3.8 MMOL/L — SIGNIFICANT CHANGE UP (ref 3.5–5.3)
PROT SERPL-MCNC: 6.6 G/DL — SIGNIFICANT CHANGE UP (ref 6–8.3)
PROT UR-MCNC: NEGATIVE MG/DL — SIGNIFICANT CHANGE UP
RBC # BLD: 5.24 M/UL — HIGH (ref 3.8–5.2)
RBC # FLD: 13.7 % — SIGNIFICANT CHANGE UP (ref 10.3–14.5)
RH IG SCN BLD-IMP: POSITIVE — SIGNIFICANT CHANGE UP
SARS-COV-2 RNA SPEC QL NAA+PROBE: SIGNIFICANT CHANGE UP
SODIUM SERPL-SCNC: 139 MMOL/L — SIGNIFICANT CHANGE UP (ref 135–145)
SP GR SPEC: 1.02 — SIGNIFICANT CHANGE UP (ref 1–1.03)
UROBILINOGEN FLD QL: 1 E.U./DL — SIGNIFICANT CHANGE UP
WBC # BLD: 7.65 K/UL — SIGNIFICANT CHANGE UP (ref 3.8–10.5)
WBC # FLD AUTO: 7.65 K/UL — SIGNIFICANT CHANGE UP (ref 3.8–10.5)

## 2020-05-12 PROCEDURE — 99284 EMERGENCY DEPT VISIT MOD MDM: CPT

## 2020-05-12 PROCEDURE — 76817 TRANSVAGINAL US OBSTETRIC: CPT | Mod: 26

## 2020-05-12 PROCEDURE — 72195 MRI PELVIS W/O DYE: CPT | Mod: 26

## 2020-05-12 PROCEDURE — 76801 OB US < 14 WKS SINGLE FETUS: CPT | Mod: 26

## 2020-05-12 PROCEDURE — 88305 TISSUE EXAM BY PATHOLOGIST: CPT | Mod: 26

## 2020-05-12 RX ORDER — ACETAMINOPHEN 500 MG
650 TABLET ORAL ONCE
Refills: 0 | Status: DISCONTINUED | OUTPATIENT
Start: 2020-05-12 | End: 2020-05-12

## 2020-05-12 RX ORDER — ONDANSETRON 8 MG/1
4 TABLET, FILM COATED ORAL ONCE
Refills: 0 | Status: COMPLETED | OUTPATIENT
Start: 2020-05-12 | End: 2020-05-12

## 2020-05-12 RX ORDER — ACETAMINOPHEN 500 MG
650 TABLET ORAL ONCE
Refills: 0 | Status: COMPLETED | OUTPATIENT
Start: 2020-05-12 | End: 2020-05-12

## 2020-05-12 RX ORDER — METOCLOPRAMIDE HCL 10 MG
10 TABLET ORAL ONCE
Refills: 0 | Status: COMPLETED | OUTPATIENT
Start: 2020-05-12 | End: 2020-05-12

## 2020-05-12 RX ORDER — SODIUM CHLORIDE 9 MG/ML
1000 INJECTION INTRAMUSCULAR; INTRAVENOUS; SUBCUTANEOUS ONCE
Refills: 0 | Status: COMPLETED | OUTPATIENT
Start: 2020-05-12 | End: 2020-05-12

## 2020-05-12 RX ORDER — HYDROMORPHONE HYDROCHLORIDE 2 MG/ML
0.5 INJECTION INTRAMUSCULAR; INTRAVENOUS; SUBCUTANEOUS
Refills: 0 | Status: DISCONTINUED | OUTPATIENT
Start: 2020-05-12 | End: 2020-05-12

## 2020-05-12 RX ADMIN — Medication 10 MILLIGRAM(S): at 20:52

## 2020-05-12 RX ADMIN — Medication 650 MILLIGRAM(S): at 04:21

## 2020-05-12 RX ADMIN — ONDANSETRON 4 MILLIGRAM(S): 8 TABLET, FILM COATED ORAL at 19:25

## 2020-05-12 RX ADMIN — SODIUM CHLORIDE 1000 MILLILITER(S): 9 INJECTION INTRAMUSCULAR; INTRAVENOUS; SUBCUTANEOUS at 04:21

## 2020-05-12 RX ADMIN — HYDROMORPHONE HYDROCHLORIDE 0.5 MILLIGRAM(S): 2 INJECTION INTRAMUSCULAR; INTRAVENOUS; SUBCUTANEOUS at 19:24

## 2020-05-12 NOTE — ED PROVIDER NOTE - CLINICAL SUMMARY MEDICAL DECISION MAKING FREE TEXT BOX
This is a pleasant 39 year old female no pmhx , currently pregnant (unsure how far along) presenting to the ed with sudden onset lower abdominal pain while urinating. states no vaginal bleeding, dysuria. pe patient appears well, non-toxic. ttp suprapubic region.

## 2020-05-12 NOTE — ED ADULT TRIAGE NOTE - OTHER COMPLAINTS
pain started one hour ago as patient was urinating, reported to be 1 week pregnant, no vag bleed, (+) nausea, no vomiting, no diarrhea. no CP, no SOB

## 2020-05-12 NOTE — H&P ADULT - NSHPPHYSICALEXAM_GEN_ALL_CORE
Vital Signs Last 24 Hrs  T(C): 36.8 (12 May 2020 03:46), Max: 36.8 (12 May 2020 03:46)  T(F): 98.2 (12 May 2020 03:46), Max: 98.2 (12 May 2020 03:46)  HR: 76 (12 May 2020 03:46) (76 - 76)  BP: 104/82 (12 May 2020 03:46) (104/82 - 104/82)  RR: 17 (12 May 2020 03:46) (17 - 17)  SpO2: 100% (12 May 2020 03:46) (100% - 100%)    Physical Exam:  Gen: NAD, comfortable  GI: soft, nontender, nondistended + BS, no rebound no guarding  BME: normal anteverted uterus, no adnexal masses appreciated , No cervical motion tenderness   Spec: Normal appearing cervix, no bleeding or discharge  Ext: no edema, erythema, tenderness

## 2020-05-12 NOTE — ED PROVIDER NOTE - OBJECTIVE STATEMENT
currently pregnant (unsure how long), prsenting to the ed with <!hr llq pain prior to arrival. states that she was urinating and began to feel a severe pain in her abdomen. no associated fevers chills nausea vomiting diarrhea chest pain palpitation cough sob. stats that in the end of april she was having vaginal bleeding for 15 days. states that she was seen by her gynecologist this past Friday and told she was pregnant-- unsure how far along, and no current vaginal bleeding.  currently pregnant (unsure how long), presenting to the ed with <!hr llq pain prior to arrival. states that she was urinating and began to feel a severe pain in her abdomen. no associated fevers chills nausea vomiting diarrhea chest pain palpitation cough sob. stats that in the end of April she was having vaginal bleeding for 15 days. states that she was seen by her gynecologist this past Friday and told she was pregnant-- unsure how far along, and no current vaginal bleeding.

## 2020-05-12 NOTE — ED PROVIDER NOTE - ATTENDING CONTRIBUTION TO CARE
here w/ early pregnancy and LLQ pain, concerning for possible ectopic preganncy. HCG high at 35K and no IUP seen on US. VSS, mild LLQ tenderness on exam, but pt well appearing, ambulatory,  non toxic appearing. GYN consulted and dispo surgical vs medical management of presumed ectopic pregnancy as per their consult

## 2020-05-12 NOTE — ED ADULT NURSE REASSESSMENT NOTE - ANCILLARY STATUS
returns from us, request to be placed in hallway and not next to any patients./radiology results pending

## 2020-05-12 NOTE — ED PROVIDER NOTE - PHYSICAL EXAMINATION
General: Patient is well developed and well nourised. Patient is alert and oriented to person, place and date. Patient is laying comfortably in stretcher and appears in no acute distress.  HEENT: Head is normocephalic and atraumatic. Pupils are equal, round and reactive. Extraocular movements intact. No evidence of nystagmus, conjunctival injection, or scleral icterus. External ears symmetric without evidence of discharge.  Nose is symmetric, non-tender, patent without evidence of discharge. Teeth in good repair. Uvula midline.   Neck: Supple with no evidence of lymphadenopathy.  Full range of motion.  Heart: Regular rate and rhythm. No murmurs, rubs or gallops.   Lungs: Clear to auscultation bilaterally with equal chest expansion. No note of wheezes, rhonchi, rales. Equal chest expansion. No note of retractions.  Abdomen: ttp suprapubic region. Bowel sounds present in all four quadrants. Soft, non-tender, non-distended without signs of masses, rebound or guarding. No note of hepatosplenomegaly. No CVA tenderness bilaterally. Negative Campbell sign. No pain present over McBurney's point.  Musculoskeletal: No edema, erythema, ecchymosis, atrophy or deformity. Full range of motion in all four extremities.  No clubbing or cyanosis. No point tenderness to palpation.   Neuro: GCS 15. Moving all extremities without discomfort. Strength is 5/5 arms and legs bilaterally. Sensation intact in all four extremities. gait steady   Skin: Warm, dry and intact without evidence of rashes, bruising, pallor, jaundice or cyanosis.   Psych: Mood and affect appropriate.

## 2020-05-12 NOTE — ED ADULT NURSE NOTE - CADM POA PRESS ULCER
Subjective   Patient ID: Wyatt is a 6 month old male who is accompanied by:mother     Well Child Assessment:  History was provided by the mother. Interval problems do not include caregiver depression, caregiver stress, chronic stress at home, lack of social support, marital discord or recent illness.   Nutrition  Types of milk consumed include formula. Additional intake includes cereal and solids. Formula - Types of formula consumed include cow's milk based. Solid Foods - Types of intake include fruits and vegetables. The patient can consume pureed foods. Feeding problems do not include burping poorly, spitting up or vomiting.   Dental  The patient has teething symptoms. Tooth eruption is beginning.  Elimination  Urination occurs more than 6 times per 24 hours. Bowel movements occur 1-3 times per 24 hours. Stools have a loose consistency. Elimination problems do not include colic, constipation, diarrhea, gas or urinary symptoms.   Sleep  The patient sleeps in his crib. Child falls asleep while in caretaker's arms and on own. Sleep positions include supine.   Safety  There is no smoking in the home. Home has working smoke alarms? yes. Home has working carbon monoxide alarms? yes. There is an appropriate car seat in use.   Screening  Immunizations are up-to-date. There are no risk factors for hearing loss. There are no risk factors for tuberculosis. There are no risk factors for oral health. There are no risk factors for lead toxicity.   Social  The caregiver enjoys the child. Childcare is provided at child's home. The childcare provider is a parent.       TOF repair 2020. Not had vaccines or well exam since. Here today for post surgical follow up    Additional concerns today: Mom reports that she was instructed to have him follow up with us and to have him get the 2 flu vaccines.    Review of Systems   Constitutional: Negative for activity change, appetite change, crying, fever and irritability.   HENT:  Negative for congestion, drooling and rhinorrhea.    Eyes: Negative for discharge.   Respiratory: Negative for cough and choking.    Cardiovascular: Negative for fatigue with feeds and sweating with feeds.   Gastrointestinal: Negative for abdominal distention, constipation, diarrhea and vomiting.   Skin: Negative for color change and rash.   Neurological: Negative for facial asymmetry.       Patient's medications, allergies, past medical, surgical, social and family histories were reviewed and updated as appropriate.    Objective   Vitals: Temp 97.6 °F (36.4 °C) (Temporal)   Ht 26.38\" (67 cm)   Wt 7.44 kg (16 lb 6.4 oz)   HC 43 cm (16.93\")   BMI 16.57 kg/m²   BSA 0.36 m²   Growth parameters are noted and are appropriate for age.  Physical Exam  Vitals signs and nursing note reviewed.   Constitutional:       General: He is active. He has a strong cry. He is not in acute distress.     Appearance: Normal appearance. He is well-developed. He is not toxic-appearing.   HENT:      Head: Normocephalic and atraumatic. No cranial deformity or facial anomaly. Anterior fontanelle is flat.      Right Ear: Tympanic membrane normal.      Left Ear: Tympanic membrane normal.      Nose: Nose normal.      Mouth/Throat:      Mouth: Mucous membranes are moist.      Pharynx: Oropharynx is clear.   Eyes:      General:         Right eye: No discharge.         Left eye: No discharge.      Conjunctiva/sclera: Conjunctivae normal.   Neck:      Musculoskeletal: Normal range of motion.   Cardiovascular:      Rate and Rhythm: Normal rate and regular rhythm.      Pulses: Normal pulses.      Heart sounds: Murmur (grade ii/vi LARA. quiet precordium. pink coloring) present.   Pulmonary:      Effort: Pulmonary effort is normal. No respiratory distress.      Breath sounds: Normal breath sounds. No wheezing.   Abdominal:      General: Bowel sounds are normal. There is no distension.      Palpations: Abdomen is soft.      Tenderness: There is no  abdominal tenderness.      Hernia: There is no hernia in the left inguinal area.   Genitourinary:     Penis: Normal and uncircumcised.       Scrotum/Testes: Normal.         Right: Tenderness not present.         Left: Tenderness not present.   Lymphadenopathy:      Cervical: No cervical adenopathy.   Skin:     General: Skin is warm.      Turgor: Normal.      Findings: No rash.   Neurological:      General: No focal deficit present.      Mental Status: He is alert.         Assessment   Problem List Items Addressed This Visit        Circulatory    Tetralogy of Fallot      Other Visit Diagnoses     Encounter for routine child health examination without abnormal findings    -  Primary    Need for vaccination        Relevant Orders    DTAP HEPB IPV COMBINED VACCINE IM (PEDIARIX) (Completed)    HIB VACC,PRP-OMP,IM(PEDVAXHIB) (Completed)    ROTAVIRUS PENTAVALENT VACC 3 DOSE(ROTATEQ) (Completed)    PNEUMOCOCCAL CONJUGATE 13 VALENT VACC(PREVNAR-13) (Completed)          Screening tests  Developmental milestones were reviewed and were: normal based on age    Immunizations   Parents have (will) accepted  History of previous adverse reactions to immunizations? no    Follow-up for the 9 month well child visit, or sooner as needed. Need to be seen prior to 8 month bday to receive the final Rotavirus vaccine.        Belem Perez MD   No

## 2020-05-12 NOTE — CONSULT NOTE ADULT - SUBJECTIVE AND OBJECTIVE BOX
39y  with Last Menstrual Period  presenting with LLQ pain to r/o ectopic pregnancy  A week prior to her ED visit found out she was pregnant by a positive urine test. On  went to her GYN office and had a serum HCG test (does not know the results), TVUS was done and showed pregnancy of unknown location.  This night felt LLQ pain, sharp and came to the ED to be evaluated.  Denies vaginal bleeding.  Denies fever, chills, chest pain, palpitations, SOB, n/v.  +flatus, +BM    OB H/x:  G1 -    G2 -    G3 - vTOP w/ D&C   G4 -    G5 -    G6 -      GYN H/x:  Hx of severe TOA needing laparotomy with removal of pelvic abscesses  Denies fibroids or cysts.  Last PAP  results pending.    MED H/x:  Hypothyroidism (no meds)  Colonoscopy 2019 with polypectomy    SURG H/x:   - Laparotomy   - L/S appendectomy    Medications:  None    Allergies:   Penicillin (throat swelling, angioedema)       Vital Signs Last 24 Hrs  T(C): 36.8 (12 May 2020 03:46), Max: 36.8 (12 May 2020 03:46)  T(F): 98.2 (12 May 2020 03:46), Max: 98.2 (12 May 2020 03:46)  HR: 76 (12 May 2020 03:46) (76 - 76)  BP: 104/82 (12 May 2020 03:46) (104/82 - 104/82)  RR: 17 (12 May 2020 03:46) (17 - 17)  SpO2: 100% (12 May 2020 03:46) (100% - 100%)    Physical Exam:  Gen: NAD, comfortable  GI: soft, nontender, nondistended + BS, no rebound no guarding  BME: normal anteverted uterus, no adnexal masses appreciated , No cervical motion tenderness   Spec: Normal appearing cervix, no bleeding or discharge  Ext: no edema, erythema, tenderness     LABS:                        14.0   7.65  )-----------( 227      ( 12 May 2020 04:09 )             43.0     05-12    139  |  107  |  8   ----------------------------<  106<H>  3.8   |  20<L>  |  0.64    Ca    8.7      12 May 2020 04:09    TPro  6.6  /  Alb  3.7  /  TBili  0.4  /  DBili  x   /  AST  11  /  ALT  8<L>  /  AlkPhos  48        Urinalysis Basic - ( 12 May 2020 06:48 )    Color: Yellow / Appearance: Clear / S.025 / pH: x  Gluc: x / Ketone: NEGATIVE  / Bili: Negative / Urobili: 1.0 E.U./dL   Blood: x / Protein: NEGATIVE mg/dL / Nitrite: NEGATIVE   Leuk Esterase: NEGATIVE / RBC: x / WBC x   Sq Epi: x / Non Sq Epi: x / Bacteria: x        RADIOLOGY & ADDITIONAL STUDIES:  < from: US Transvaginal, OB (20 @ 05:08) >    EXAM:  US OB LES THAN 14 WKS 1ST GEST                          EXAM:  US OB TRANSVAGINAL                          PROCEDURE DATE:  2020          INTERPRETATION:  INDICATION: Edema, left lower quadrant pain, vaginal bleeding. . [id number]. Previous .    LMP: 2020.    TECHNIQUE: Transabdominal and transvaginal OB ultrasound was performed.    PRIOR STUDIES: None.    FINDINGS:     Uterus: Measures 10.2 x 5.3 x 6 point cm. Postsurgical changes.  Endometrium: Measures 1.1 cm in thickness. No intrauterine gestation identified. Nonspecific trace fluid.  Cervix: 4.6 cm in length.  Right ovary: Measures 3.0 x 1.0 x 1.7 cm. Small follicles. Flow present. A 2.9 x 1.4 x 2.6 cm hypoechoic structure with minimal internal echogenicity in the right adnexa.  Left ovary: Visualized transabdominally. Measures 4.4 x 2.9 x 3.3 cm. Flow present. A 2.0 x 1.8 x 1.5 cm round structure with mild internal echogenicity in the left adnexa.   Additional: Trace free fluid.    IMPRESSION:    No intrauterine gestation identified. Nonspecific fluid in the endometrial canal. Tubular right adnexal structure, which may represent a hydrosalpinx. Left adnexal structure, which may represent a complex corpus luteum. Ectopic pregnancy cannot be excluded in either adnexa. Additional differential diagnosis includes spontaneous . Recommend close follow-up.    Dr. Lipscomb discussed with [facility].            Thank you for the opportunity to participate in the care of this patient.    SHERRY DARBY, RADIOLOGY RESIDENT  This document has been electronically signed.  JACOBO BISWAS M.D., ATTENDING RADIOLOGIST  This document has been electronically signed. May 12 2020  6:52AM                  < end of copied text >

## 2020-05-12 NOTE — H&P ADULT - ASSESSMENT
39y  with Last Menstrual Period  presenting with LLQ pain to r/o ectopic pregnancy. US inconclusive so MR performed, found ectopic in left adnexa. Patient hemodynamically stable, however due to high beta hcg she is not a good candidate for methotrexate. Patient counseled on risks and benefits of options for management. Agrees with our recommendation for surgical management. Patient added on for laparoscopic removal of ectopic pregnancy and possible salpingectomy.     Discussed plan with attending Dr. Ruiz.

## 2020-05-12 NOTE — ED PROVIDER NOTE - PROGRESS NOTE DETAILS
hcg [id number], us no evidence of pregnancy. pt to go back to US for further evaluation. gyn consulted and aware. possible ectopic seen on us- awaiting gyn reccs. disposition pending

## 2020-05-12 NOTE — CONSULT NOTE ADULT - ASSESSMENT
39y  with Last Menstrual Period  presenting with LLQ pain to r/o ectopic pregnancy.  During her stay in the ED she received Tylenol and the pain subsided considerably.  Normal vital signs, hemodynamically stable.  HB 14  HCG 34,439  On US  Ectopic pregnancy cannot be excluded in either adnexa.    The patient prefers to proceed with surgery how ever, she is a poor surgical candidate and the diagnosis in unclear yet by US.    Will be evaluated again by OG-GYN attending.

## 2020-05-12 NOTE — H&P ADULT - HISTORY OF PRESENT ILLNESS
39y  with Last Menstrual Period  presenting with LLQ pain to r/o ectopic pregnancy  A week prior to her ED visit found out she was pregnant by a positive urine test. On  went to her GYN office and had a serum HCG test (does not know the results), TVUS was done and showed pregnancy of unknown location.  This night felt LLQ pain, sharp and came to the ED to be evaluated.  Denies vaginal bleeding.  Denies fever, chills, chest pain, palpitations, SOB, n/v.  +flatus, +BM    OB H/x:  G1 -    G2 -    G3 - vTOP w/ D&C   G4 -    G5 -    G6 -      GYN H/x:  Hx of severe TOA needing laparotomy with removal of pelvic abscesses  Denies fibroids or cysts.  Last PAP  results pending.    MED H/x:  Hypothyroidism (no meds)  Colonoscopy 2019 with polypectomy    SURG H/x:   - Laparotomy   - L/S appendectomy    Medications:  None    Allergies:   Penicillin (throat swelling, angioedema)

## 2020-05-12 NOTE — H&P ADULT - ATTENDING COMMENTS
patient seen and evaluated. Workup finally noted a ectopic on the left.   Discussed findings with patient and she desires both tubes removed given pathology noted in both.   Patient is 39 with total of 6 kids ( 1  and 2 autistic).   Will plan to proceed with surgery and discussed high likelihood that she would have conversion to open given extensive surgery  and/or based upon findings in the OR  If opened she will stay in hospital for recovery otherwise will be discharged to home.

## 2020-05-12 NOTE — PACU DISCHARGE NOTE - COMMENTS
Pt A&ox4, operative site clean, dry and intact; reports pain controlled with current regimen; plan of care and discharge instructions reviewed with verbalized understanding.

## 2020-05-12 NOTE — ED ADULT NURSE NOTE - CHPI ED NUR SYMPTOMS NEG
no abdominal distension/no hematuria/no nausea/no vomiting/no burning urination/no fever/no diarrhea/no blood in stool/no chills/no dysuria

## 2020-05-12 NOTE — ED ADULT NURSE REASSESSMENT NOTE - NS ED NURSE REASSESS COMMENT FT1
PT CONTINUES AT MRI AT THIS TIME. AWAITING RETURN.
PT RESTING IN BED. AWAITING DISPOSITION AT THIS TIME.
PT RESTING COMFORTABLY AT THIS TIME. STATES MILD DISCOMFORT TO LOWER ABD/PELVIS. NO N/V/D. RESP EASY NON LABORED AT THIS TIME. WILL CONTINUE TO MONITOR AND AWAIT DISPOSITION.

## 2020-05-12 NOTE — ED ADULT NURSE NOTE - OBJECTIVE STATEMENT
Pt co LLQ abd pain that began tonight when pt woke up to urinate- could not get up off toilet due to pain. Pt reports some relief in pain when pressure is applied to the LLQ. Pt endorses abnormally vaginally bleeding from 4/15-4/27. Seen by GYN 5/8 to find pt is pregnant, no other information other than GYN reports positive pregnancy by urine and bloodwork. Denies vaginally bleeding at this time, blood in stool, change in BM pattern, CP, urinary symptoms, nausea/vomiting/diarrhea, fevers/chills, SOB, change in diet. Abd soft, nondistended, denies tenderness on palpation.

## 2020-05-12 NOTE — H&P ADULT - NSHPLABSRESULTS_GEN_ALL_CORE
INTERPRETATION:    PELVIC MRI    History: Pregnant patient with abdominal pain. Rule out ectopic pregnancy. . Beta-hCG 34,439. History of prior  section.    Technique:  Axial T1WI, axial, sagittal, and coronal T2WI, axial diffusion-weighted imaging, and axial and sagittal gradient echo images with fat saturation of the pelvis were obtained. Intravenous contrast was not administered to this pregnant patient.    Prior films: Pelvic ultrasound from 2020.    Findings: The uterus is anteverted. The uterus measures 10.8 x 5.5 x 6.1 cm. There is heterogeneity of the myometrium in the posterior uterine body. The junctional zone is of normal thickness, measuring 0.5 cm. The endometrium is of normal thickness, measuring 0.5 cm. No intrauterine gestation is seen. Slightly heterogeneous material within endometrial canal could represent blood products. Cervix is normal. Vagina normal.    There is a 3.0 x 2.9 x 4.2 cm complex cystic mass in the left adnexa, contiguous with the left fundal portion of the uterus. This mass is separate from the left ovary. It is consistent with ectopic pregnancy. This lesion has a thick wall and a 1.9 x 1.8 x 1.7 cm central cystic component, consistent with a gestational sac. Within the gestational sac is a 0.7 x 1.4 x 1.1 cm hypointense structure which likely represents a fetal pole.    The left ovary is located more posteriorly in the left pelvis. It measures 1.8 x 2.4 x 2.2 cm and has a normal appearance. The right ovary is also normal in sizeand appearance, measuring 2.1 x 1.5 x 1.8 cm. There is a tubular cystic lesion in the right adnexa adjacent to the right ovary, measuring 2.7 x 2.1 x 3.6 cm. This is consistent with hydrosalpinx. The fluid within the hydrosalpinx is somewhat complex,with mild increased signal intensity on T1-weighted images, suggesting that there may be blood products or proteinaceous material within the dilated tube.    There is no free fluid in the pelvis. No lymphadenopathy in the pelvis.    Impression: 1. Ectopic pregnancy in the left adnexa.    2. Complex right hydrosalpinx, possibly hematosalpinx or pyosalpinx.    3. There may be small blood products within the endometrial canal.    4. Heterogeneous uterine myometrium, could be due to a leiomyomatous uterus.

## 2020-05-12 NOTE — BRIEF OPERATIVE NOTE - OPERATION/FINDINGS
left tubal ectopic visualized on left fallopian tube, site of active bleeding noted. Many omental adhesions to anterior abdominal wall. left tube removed completed laparoscopically. right tube with blood and fluid, but tube was adhered to right ovary so was left inside. normal appearing uterus. Hemoperitoneum 250 cc.

## 2020-05-13 PROCEDURE — 72195 MRI PELVIS W/O DYE: CPT

## 2020-05-13 PROCEDURE — 83690 ASSAY OF LIPASE: CPT

## 2020-05-13 PROCEDURE — 85025 COMPLETE CBC W/AUTO DIFF WBC: CPT

## 2020-05-13 PROCEDURE — 36415 COLL VENOUS BLD VENIPUNCTURE: CPT

## 2020-05-13 PROCEDURE — 81003 URINALYSIS AUTO W/O SCOPE: CPT

## 2020-05-13 PROCEDURE — 76801 OB US < 14 WKS SINGLE FETUS: CPT

## 2020-05-13 PROCEDURE — 86901 BLOOD TYPING SEROLOGIC RH(D): CPT

## 2020-05-13 PROCEDURE — 88305 TISSUE EXAM BY PATHOLOGIST: CPT

## 2020-05-13 PROCEDURE — 86850 RBC ANTIBODY SCREEN: CPT

## 2020-05-13 PROCEDURE — 84702 CHORIONIC GONADOTROPIN TEST: CPT

## 2020-05-13 PROCEDURE — 99285 EMERGENCY DEPT VISIT HI MDM: CPT | Mod: 25

## 2020-05-13 PROCEDURE — 80053 COMPREHEN METABOLIC PANEL: CPT

## 2020-05-13 PROCEDURE — 76817 TRANSVAGINAL US OBSTETRIC: CPT

## 2020-05-13 PROCEDURE — 87635 SARS-COV-2 COVID-19 AMP PRB: CPT

## 2020-05-14 LAB — SURGICAL PATHOLOGY STUDY: SIGNIFICANT CHANGE UP

## 2020-05-16 DIAGNOSIS — O08.0 GENITAL TRACT AND PELVIC INFECTION FOLLOWING ECTOPIC AND MOLAR PREGNANCY: ICD-10-CM

## 2020-05-16 DIAGNOSIS — O00.102 LEFT TUBAL PREGNANCY WITHOUT INTRAUTERINE PREGNANCY: ICD-10-CM

## 2020-05-16 DIAGNOSIS — K21.9 GASTRO-ESOPHAGEAL REFLUX DISEASE WITHOUT ESOPHAGITIS: ICD-10-CM

## 2020-05-16 DIAGNOSIS — E03.9 HYPOTHYROIDISM, UNSPECIFIED: ICD-10-CM

## 2020-05-16 DIAGNOSIS — K66.1 HEMOPERITONEUM: ICD-10-CM

## 2020-05-16 DIAGNOSIS — G43.909 MIGRAINE, UNSPECIFIED, NOT INTRACTABLE, WITHOUT STATUS MIGRAINOSUS: ICD-10-CM

## 2020-05-16 DIAGNOSIS — O00.90 UNSPECIFIED ECTOPIC PREGNANCY WITHOUT INTRAUTERINE PREGNANCY: ICD-10-CM

## 2020-05-16 DIAGNOSIS — O08.1 DELAYED OR EXCESSIVE HEMORRHAGE FOLLOWING ECTOPIC AND MOLAR PREGNANCY: ICD-10-CM

## 2020-05-16 DIAGNOSIS — J45.909 UNSPECIFIED ASTHMA, UNCOMPLICATED: ICD-10-CM

## 2020-06-01 ENCOUNTER — OUTPATIENT (OUTPATIENT)
Dept: OUTPATIENT SERVICES | Facility: HOSPITAL | Age: 40
LOS: 1 days | End: 2020-06-01
Payer: MEDICAID

## 2020-06-10 DIAGNOSIS — Z71.89 OTHER SPECIFIED COUNSELING: ICD-10-CM

## 2020-06-15 PROCEDURE — G9001: CPT

## 2021-01-12 ENCOUNTER — EMERGENCY (EMERGENCY)
Facility: HOSPITAL | Age: 41
LOS: 1 days | Discharge: HOME | End: 2021-01-12
Attending: EMERGENCY MEDICINE | Admitting: EMERGENCY MEDICINE
Payer: MEDICAID

## 2021-01-12 VITALS
OXYGEN SATURATION: 98 % | SYSTOLIC BLOOD PRESSURE: 113 MMHG | RESPIRATION RATE: 18 BRPM | TEMPERATURE: 98 F | DIASTOLIC BLOOD PRESSURE: 70 MMHG | HEART RATE: 66 BPM

## 2021-01-12 VITALS
RESPIRATION RATE: 18 BRPM | HEIGHT: 67 IN | DIASTOLIC BLOOD PRESSURE: 71 MMHG | TEMPERATURE: 98 F | OXYGEN SATURATION: 98 % | SYSTOLIC BLOOD PRESSURE: 104 MMHG | HEART RATE: 81 BPM

## 2021-01-12 DIAGNOSIS — R10.13 EPIGASTRIC PAIN: ICD-10-CM

## 2021-01-12 LAB
ALBUMIN SERPL ELPH-MCNC: 3.9 G/DL — SIGNIFICANT CHANGE UP (ref 3.3–5)
ALP SERPL-CCNC: 54 U/L — SIGNIFICANT CHANGE UP (ref 40–120)
ALT FLD-CCNC: 10 U/L — SIGNIFICANT CHANGE UP (ref 10–45)
ANION GAP SERPL CALC-SCNC: 10 MMOL/L — SIGNIFICANT CHANGE UP (ref 5–17)
APPEARANCE UR: CLEAR — SIGNIFICANT CHANGE UP
AST SERPL-CCNC: 13 U/L — SIGNIFICANT CHANGE UP (ref 10–40)
BACTERIA # UR AUTO: PRESENT /HPF
BASOPHILS # BLD AUTO: 0.06 K/UL — SIGNIFICANT CHANGE UP (ref 0–0.2)
BASOPHILS NFR BLD AUTO: 0.9 % — SIGNIFICANT CHANGE UP (ref 0–2)
BILIRUB SERPL-MCNC: 0.5 MG/DL — SIGNIFICANT CHANGE UP (ref 0.2–1.2)
BILIRUB UR-MCNC: NEGATIVE — SIGNIFICANT CHANGE UP
BUN SERPL-MCNC: 9 MG/DL — SIGNIFICANT CHANGE UP (ref 7–23)
CALCIUM SERPL-MCNC: 9 MG/DL — SIGNIFICANT CHANGE UP (ref 8.4–10.5)
CHLORIDE SERPL-SCNC: 106 MMOL/L — SIGNIFICANT CHANGE UP (ref 96–108)
CO2 SERPL-SCNC: 23 MMOL/L — SIGNIFICANT CHANGE UP (ref 22–31)
COLOR SPEC: YELLOW — SIGNIFICANT CHANGE UP
COMMENT - URINE: SIGNIFICANT CHANGE UP
CREAT SERPL-MCNC: 0.71 MG/DL — SIGNIFICANT CHANGE UP (ref 0.5–1.3)
DIFF PNL FLD: ABNORMAL
EOSINOPHIL # BLD AUTO: 0.17 K/UL — SIGNIFICANT CHANGE UP (ref 0–0.5)
EOSINOPHIL NFR BLD AUTO: 2.5 % — SIGNIFICANT CHANGE UP (ref 0–6)
EPI CELLS # UR: ABNORMAL /HPF (ref 0–5)
GLUCOSE SERPL-MCNC: 104 MG/DL — HIGH (ref 70–99)
GLUCOSE UR QL: NEGATIVE — SIGNIFICANT CHANGE UP
HCT VFR BLD CALC: 44.5 % — SIGNIFICANT CHANGE UP (ref 34.5–45)
HGB BLD-MCNC: 14.1 G/DL — SIGNIFICANT CHANGE UP (ref 11.5–15.5)
HIV 1+2 AB+HIV1 P24 AG SERPL QL IA: SIGNIFICANT CHANGE UP
IMM GRANULOCYTES NFR BLD AUTO: 0.3 % — SIGNIFICANT CHANGE UP (ref 0–1.5)
KETONES UR-MCNC: NEGATIVE — SIGNIFICANT CHANGE UP
LEUKOCYTE ESTERASE UR-ACNC: NEGATIVE — SIGNIFICANT CHANGE UP
LIDOCAIN IGE QN: 24 U/L — SIGNIFICANT CHANGE UP (ref 7–60)
LYMPHOCYTES # BLD AUTO: 2.19 K/UL — SIGNIFICANT CHANGE UP (ref 1–3.3)
LYMPHOCYTES # BLD AUTO: 32.6 % — SIGNIFICANT CHANGE UP (ref 13–44)
MCHC RBC-ENTMCNC: 26.1 PG — LOW (ref 27–34)
MCHC RBC-ENTMCNC: 31.7 GM/DL — LOW (ref 32–36)
MCV RBC AUTO: 82.3 FL — SIGNIFICANT CHANGE UP (ref 80–100)
MONOCYTES # BLD AUTO: 0.46 K/UL — SIGNIFICANT CHANGE UP (ref 0–0.9)
MONOCYTES NFR BLD AUTO: 6.8 % — SIGNIFICANT CHANGE UP (ref 2–14)
NEUTROPHILS # BLD AUTO: 3.82 K/UL — SIGNIFICANT CHANGE UP (ref 1.8–7.4)
NEUTROPHILS NFR BLD AUTO: 56.9 % — SIGNIFICANT CHANGE UP (ref 43–77)
NITRITE UR-MCNC: NEGATIVE — SIGNIFICANT CHANGE UP
NRBC # BLD: 0 /100 WBCS — SIGNIFICANT CHANGE UP (ref 0–0)
PH UR: 6 — SIGNIFICANT CHANGE UP (ref 5–8)
PLATELET # BLD AUTO: 237 K/UL — SIGNIFICANT CHANGE UP (ref 150–400)
POTASSIUM SERPL-MCNC: 4.2 MMOL/L — SIGNIFICANT CHANGE UP (ref 3.5–5.3)
POTASSIUM SERPL-SCNC: 4.2 MMOL/L — SIGNIFICANT CHANGE UP (ref 3.5–5.3)
PROT SERPL-MCNC: 6.9 G/DL — SIGNIFICANT CHANGE UP (ref 6–8.3)
PROT UR-MCNC: NEGATIVE MG/DL — SIGNIFICANT CHANGE UP
RBC # BLD: 5.41 M/UL — HIGH (ref 3.8–5.2)
RBC # FLD: 13.6 % — SIGNIFICANT CHANGE UP (ref 10.3–14.5)
RBC CASTS # UR COMP ASSIST: < 5 /HPF — SIGNIFICANT CHANGE UP
SODIUM SERPL-SCNC: 139 MMOL/L — SIGNIFICANT CHANGE UP (ref 135–145)
SP GR SPEC: 1.02 — SIGNIFICANT CHANGE UP (ref 1–1.03)
UROBILINOGEN FLD QL: 0.2 E.U./DL — SIGNIFICANT CHANGE UP
WBC # BLD: 6.72 K/UL — SIGNIFICANT CHANGE UP (ref 3.8–10.5)
WBC # FLD AUTO: 6.72 K/UL — SIGNIFICANT CHANGE UP (ref 3.8–10.5)
WBC UR QL: < 5 /HPF — SIGNIFICANT CHANGE UP

## 2021-01-12 PROCEDURE — 99285 EMERGENCY DEPT VISIT HI MDM: CPT

## 2021-01-12 PROCEDURE — 87086 URINE CULTURE/COLONY COUNT: CPT

## 2021-01-12 PROCEDURE — 81001 URINALYSIS AUTO W/SCOPE: CPT

## 2021-01-12 PROCEDURE — 83690 ASSAY OF LIPASE: CPT

## 2021-01-12 PROCEDURE — 96375 TX/PRO/DX INJ NEW DRUG ADDON: CPT

## 2021-01-12 PROCEDURE — 87389 HIV-1 AG W/HIV-1&-2 AB AG IA: CPT

## 2021-01-12 PROCEDURE — 99284 EMERGENCY DEPT VISIT MOD MDM: CPT | Mod: 25

## 2021-01-12 PROCEDURE — 36415 COLL VENOUS BLD VENIPUNCTURE: CPT

## 2021-01-12 PROCEDURE — 93010 ELECTROCARDIOGRAM REPORT: CPT

## 2021-01-12 PROCEDURE — 96374 THER/PROPH/DIAG INJ IV PUSH: CPT

## 2021-01-12 PROCEDURE — 93005 ELECTROCARDIOGRAM TRACING: CPT

## 2021-01-12 PROCEDURE — 85025 COMPLETE CBC W/AUTO DIFF WBC: CPT

## 2021-01-12 PROCEDURE — 80053 COMPREHEN METABOLIC PANEL: CPT

## 2021-01-12 RX ORDER — ONDANSETRON 8 MG/1
4 TABLET, FILM COATED ORAL ONCE
Refills: 0 | Status: COMPLETED | OUTPATIENT
Start: 2021-01-12 | End: 2021-01-12

## 2021-01-12 RX ORDER — SUCRALFATE 1 G
1 TABLET ORAL
Qty: 15 | Refills: 0
Start: 2021-01-12 | End: 2021-01-16

## 2021-01-12 RX ORDER — ONDANSETRON 8 MG/1
1 TABLET, FILM COATED ORAL
Qty: 9 | Refills: 0
Start: 2021-01-12 | End: 2021-01-15

## 2021-01-12 RX ORDER — SODIUM CHLORIDE 9 MG/ML
1000 INJECTION INTRAMUSCULAR; INTRAVENOUS; SUBCUTANEOUS ONCE
Refills: 0 | Status: COMPLETED | OUTPATIENT
Start: 2021-01-12 | End: 2021-01-12

## 2021-01-12 RX ORDER — FAMOTIDINE 10 MG/ML
1 INJECTION INTRAVENOUS
Qty: 14 | Refills: 0
Start: 2021-01-12 | End: 2021-01-19

## 2021-01-12 RX ORDER — SUCRALFATE 1 G
1 TABLET ORAL
Qty: 15 | Refills: 0
Start: 2021-01-12 | End: 2021-01-17

## 2021-01-12 RX ORDER — FAMOTIDINE 10 MG/ML
20 INJECTION INTRAVENOUS ONCE
Refills: 0 | Status: COMPLETED | OUTPATIENT
Start: 2021-01-12 | End: 2021-01-12

## 2021-01-12 RX ORDER — ONDANSETRON 8 MG/1
1 TABLET, FILM COATED ORAL
Qty: 9 | Refills: 0
Start: 2021-01-12 | End: 2021-01-14

## 2021-01-12 RX ORDER — ACETAMINOPHEN 500 MG
1000 TABLET ORAL ONCE
Refills: 0 | Status: COMPLETED | OUTPATIENT
Start: 2021-01-12 | End: 2021-01-12

## 2021-01-12 RX ORDER — LIDOCAINE 4 G/100G
5 CREAM TOPICAL ONCE
Refills: 0 | Status: COMPLETED | OUTPATIENT
Start: 2021-01-12 | End: 2021-01-12

## 2021-01-12 RX ORDER — FAMOTIDINE 10 MG/ML
1 INJECTION INTRAVENOUS
Qty: 14 | Refills: 0
Start: 2021-01-12 | End: 2021-01-18

## 2021-01-12 RX ADMIN — FAMOTIDINE 20 MILLIGRAM(S): 10 INJECTION INTRAVENOUS at 07:39

## 2021-01-12 RX ADMIN — SODIUM CHLORIDE 1000 MILLILITER(S): 9 INJECTION INTRAMUSCULAR; INTRAVENOUS; SUBCUTANEOUS at 07:41

## 2021-01-12 RX ADMIN — LIDOCAINE 5 MILLILITER(S): 4 CREAM TOPICAL at 07:39

## 2021-01-12 RX ADMIN — Medication 30 MILLILITER(S): at 07:39

## 2021-01-12 RX ADMIN — Medication 400 MILLIGRAM(S): at 08:45

## 2021-01-12 RX ADMIN — ONDANSETRON 4 MILLIGRAM(S): 8 TABLET, FILM COATED ORAL at 07:50

## 2021-01-12 NOTE — ED PROVIDER NOTE - PROVIDER TOKENS
PROVIDER:[TOKEN:[[id number]]],PROVIDER:[TOKEN:[[id number]]],PROVIDER:[TOKEN:[[id number]]]

## 2021-01-12 NOTE — ED PROVIDER NOTE - PATIENT PORTAL LINK FT
You can access the FollowMyHealth Patient Portal offered by [facility] by registering at the following website: [email or web address]. By joining [employer]’s FollowMyHealth portal, you will also be able to view your health information using other applications (apps) compatible with our system.

## 2021-01-12 NOTE — ED PROVIDER NOTE - CARE PROVIDERS DIRECT ADDRESSES
,DirectAddress_Unknown,[email or web address]

## 2021-01-12 NOTE — ED ADULT NURSE REASSESSMENT NOTE - NS ED NURSE REASSESS COMMENT FT1
Received report from Bonita STRAUSS from nights,  found patient aaox3 sitting up in stretcher.   Urine sample collected. Pt updated on current status.  Will continue to monitor.

## 2021-01-12 NOTE — ED PROVIDER NOTE - CLINICAL SUMMARY MEDICAL DECISION MAKING FREE TEXT BOX
pt c/o mid upper abd pain since last night, onset after eating hot dog and fries for dinner, no associated fevers/chills/n/v/d/anorexia, well appearing, min epigastric tend w/o guarding/rebound/neg coto's sign, suspect gastritis vs gerd, doubt cholecystitis / sbo / perforation / dissection, labs  , given gi cocktail and h2 blocker w/ pt c/o mid upper abd pain since last night, onset after eating hot dog and fries for dinner, no associated fevers/chills/n/v/d/anorexia, well appearing, min epigastric tend w/o guarding/rebound/neg coto's sign, suspect gastritis vs gerd, doubt cholecystitis / sbo / perforation / dissection, labs wnl , given gi cocktail and h2 blocker w/good relief, no indication for any emergent imaging at this time, will dc home w/diet modifications h2 blocker and gi f/u , pt understands and agrees w/plan

## 2021-01-12 NOTE — ED PROVIDER NOTE - NSFOLLOWUPINSTRUCTIONS_ED_ALL_ED_FT
PLEASE FOLLOW UP WITH GI DOCTOR  TAKE MEDICATION AS DISCUSSED  EAT SMALL, FREQUENT MEALS, AVOID SPICY, FRIED FOODS, AVOID CAFFEINE AND ALCOHOL  Diet for Stomach Ulcers and Gastritis  WHAT YOU NEED TO KNOW:  A diet for stomach ulcers and gastritis is a meal plan that limits foods that irritate your stomach. Certain foods may worsen symptoms such as stomach pain, bloating, heartburn, or indigestion.  DISCHARGE INSTRUCTIONS:  Foods to limit or avoid: You may need to avoid acidic, spicy, or high-fat foods. Not all foods affect everyone the same way. You will need to learn which foods worsen your symptoms and limit those foods. The following are some foods that may worsen ulcer or gastritis symptoms:  •Beverages: ?Whole milk and chocolate milk  ?Hot cocoa and cola  ?Any beverage with caffeine  ?Regular and decaffeinated coffee  ?Peppermint and spearmint tea  ?Green and black tea, with or without caffeine  ?Orange and grapefruit juices  ?Drinks that contain alcohol  •Spices and seasonings: ?Black and red pepper  ?Chili powder  ?Mustard seed and nutmeg  •Other foods: ?Dairy foods made from whole milk or cream  ?Chocolate  ?Spicy or strongly flavored cheeses, such as jalapeno or black pepper  ?Highly seasoned, high-fat meats, such as sausage, salami, sahu, ham, and cold cuts  ?Hot chiles and pepper  ?Tomato products, such as tomato paste, tomato sauce, or tomato juice  Foods to include: Eat a variety of healthy foods from all the food groups. Eat fruits, vegetables, whole grains, and fat-free or low-fat dairy foods. Whole grains include whole-wheat breads, cereals, pasta, and brown rice. Choose lean meats, poultry (chicken and turkey), fish, beans, eggs, and nuts. A healthy meal plan is low in unhealthy fats, salt, and added sugar. Healthy fats include olive oil and canola oil. Ask your dietitian for more information about a healthy diet.  Healthy Foods  Other helpful guidelines:   •Do not eat right before bedtime. Stop eating at least 2 hours before bedtime.  •Eat small, frequent meals. Your stomach may tolerate small, frequent meals better than large meals.  Diet for Stomach Ulcers and Gastritis  WHAT YOU NEED TO KNOW:  A diet for stomach ulcers and gastritis is a meal plan that limits foods that irritate your stomach. Certain foods may worsen symptoms such as stomach pain, bloating, heartburn, or indigestion.  DISCHARGE INSTRUCTIONS:  Foods to limit or avoid: You may need to avoid acidic, spicy, or high-fat foods. Not all foods affect everyone the same way. You will need to learn which foods worsen your symptoms and limit those foods. The following are some foods that may worsen ulcer or gastritis symptoms:  •Beverages: ?Whole milk and chocolate milk  ?Hot cocoa and cola  ?Any beverage with caffeine  ?Regular and decaffeinated coffee  ?Peppermint and spearmint tea  ?Green and black tea, with or without caffeine  ?Orange and grapefruit juices  ?Drinks that contain alcohol  •Spices and seasonings: ?Black and red pepper  ?Chili powder  ?Mustard seed and nutmeg  •Other foods: ?Dairy foods made from whole milk or cream  ?Chocolate  ?Spicy or strongly flavored cheeses, such as jalapeno or black pepper  ?Highly seasoned, high-fat meats, such as sausage, salami, sahu, ham, and cold cuts  ?Hot chiles and peppers  ?Tomato products, such as tomato paste, tomato sauce, or tomato juice  Foods to include: Eat a variety of healthy foods from all the food groups. Eat fruits, vegetables, whole grains, and fat-free or low-fat dairy foods. Whole grains include whole-wheat breads, cereals, pasta, and brown rice. Choose lean meats, poultry (chicken and turkey), fish, beans, eggs, and nuts. A healthy meal plan is low in unhealthy fats, salt, and added sugar. Healthy fats include olive oil and canola oil. Ask your dietitian for more information about a healthy diet  Healthy Foods  Other helpful guidelines:   •Do not eat right before bedtime. Stop eating at least 2 hours before bedtime.  •Eat small, frequent meals. Your stomach may tolerate small, frequent meals better than large meals

## 2021-01-12 NOTE — ED ADULT NURSE NOTE - OBJECTIVE STATEMENT
pt is 40y female, here for nausea and bilat flank and epigastric pain since yesterday, pt also reports pain on urination and urinary frequency x4 days, pt denies any blood in urine, vomiting/diarrhea, fevers or hx of kidney stones, pt is a&ox3, ambulatory with steady gait, CVA tenderness noted in bilat flanks, abd soft, non-distended, TTP in epigastric and RLQ, NAD present

## 2021-01-12 NOTE — ED PROVIDER NOTE - CARE PROVIDER_API CALL
Isa Callaway  GASTROENTEROLOGY  [address] [place] [address], [address]  [place] [address]  Phone: [phone]  Fax: [phone]  Follow Up Time:     Amos Sweeney)  Medicine  [address] [place] [address], [place] [address]  Phone: [phone]  Fax: [phone]  Follow Up Time:     Grayson Chapman)  Gastroenterology  [address], [place] [address]  Phone: [phone]  Fax: [phone]  Follow Up Time:

## 2021-01-12 NOTE — ED PROVIDER NOTE - OBJECTIVE STATEMENT
The pt is a 41 y/o F, who presents to ED c/o abd pain since last night. Pt ate a hot dog and fries, developed epigastric pain 2 hrs later, pain is to mid upper abd, constant, 8/10, initially "felt like gas was tracking to my kidneys", took maalox and tums w/o relief, then took tyl w/good relief. PSH appendectomy.  Had endoscopy 2 yrs ago but never f/u on results. Denies n/v/d, anorexia, cp, sob, fevers, chills, dizziness.

## 2021-01-12 NOTE — ED PROVIDER NOTE - ATTENDING CONTRIBUTION TO CARE
41 yo F s/p ectopic May 2020 p/w epigastric pain last night 2 hours after eating hotdog and french fries.  Pain is constant cramp like epigastric radiating to back last night, but not this AM.  No n/v/d, fever, chills, anorexia. S/P appendectomy in past.  No chest pain, sob.  Pt HD stable, well appearing, nad, epigastric ttp on exam, no r/g, symmetric pulses, nl s1/s2, no le edema or calf ttp, negative murphys sign.  Give above concerned most for gerd/gastritis/pud.  Do not suspect ACS, pe, dissection, perforated bowel or acute ricco given clinical context and presentation.  Plan check EKG, labs, give GI cocktail, fluids and reassess.

## 2021-01-13 LAB
CULTURE RESULTS: SIGNIFICANT CHANGE UP
SPECIMEN SOURCE: SIGNIFICANT CHANGE UP

## 2021-06-07 ENCOUNTER — EMERGENCY (EMERGENCY)
Facility: HOSPITAL | Age: 41
LOS: 1 days | Discharge: HOME | End: 2021-06-07
Attending: EMERGENCY MEDICINE | Admitting: EMERGENCY MEDICINE
Payer: MEDICAID

## 2021-06-07 VITALS
OXYGEN SATURATION: 95 % | DIASTOLIC BLOOD PRESSURE: 90 MMHG | HEART RATE: 77 BPM | WEIGHT: 203.93 LBS | HEIGHT: 67 IN | RESPIRATION RATE: 18 BRPM | SYSTOLIC BLOOD PRESSURE: 119 MMHG | TEMPERATURE: 97 F

## 2021-06-07 DIAGNOSIS — Z88.0 ALLERGY STATUS TO PENICILLIN: ICD-10-CM

## 2021-06-07 DIAGNOSIS — M25.511 PAIN IN RIGHT SHOULDER: ICD-10-CM

## 2021-06-07 DIAGNOSIS — J45.909 UNSPECIFIED ASTHMA, UNCOMPLICATED: ICD-10-CM

## 2021-06-07 DIAGNOSIS — Z91.81 HISTORY OF FALLING: ICD-10-CM

## 2021-06-07 DIAGNOSIS — M54.9 DORSALGIA, UNSPECIFIED: ICD-10-CM

## 2021-06-07 DIAGNOSIS — E03.9 HYPOTHYROIDISM, UNSPECIFIED: ICD-10-CM

## 2021-06-07 DIAGNOSIS — Z86.69 PERSONAL HISTORY OF OTHER DISEASES OF THE NERVOUS SYSTEM AND SENSE ORGANS: ICD-10-CM

## 2021-06-07 DIAGNOSIS — Z87.19 PERSONAL HISTORY OF OTHER DISEASES OF THE DIGESTIVE SYSTEM: ICD-10-CM

## 2021-06-07 PROCEDURE — 99282 EMERGENCY DEPT VISIT SF MDM: CPT

## 2021-06-07 PROCEDURE — 99283 EMERGENCY DEPT VISIT LOW MDM: CPT

## 2021-06-07 NOTE — ED PROVIDER NOTE - OBJECTIVE STATEMENT
41 y/o F with no PMHx presents to the ED w/ b/l arm numbness and R arm pain s/p fall onto back in December. Patient states she fell at that time onto rock while swimming and "rolled", no LOC or head injury at that time. Pain has since worsened with numbness to b/l arms. Last f/u with PT 1mo ago, notes no recent ortho f/u although notes that she had "nerve testing" after the incident but "none of that stuff worked". Last took Tylenol 1000mg 2h PTA and taking methocarbamol as rx'd. No weakness, vomiting, bowel or bladder dysfunction, back pain, neck pain, or any other complaints. 39 y/o F with no PMHx presents to the ED w/ b/l arm numbness and R arm pain s/p fall onto back in December. Patient states she fell at that time onto rock while swimming and "rolled", no LOC or head injury at that time. Pain has since worsened with numbness to b/l arms. Last f/u with PT 1mo ago, notes no recent ortho f/u although notes that she had "nerve testing" after the incident and d/up with numerous specialties, but "none of that stuff worked". Last took Tylenol 1000mg 2h PTA and taking methocarbamol as rx'd. No weakness, vomiting, bowel or bladder dysfunction, back pain, neck pain, or any other complaints.

## 2021-06-07 NOTE — ED ADULT NURSE NOTE - OBJECTIVE STATEMENT
Patient presents to the ED complaining of bilateral arm pain and numbness for one month. Patient reports that [place] and hit her right scapula area. Patient reports that she has had xray done and is currently getting physical therapy. Patient with equal strength bilaterally.

## 2021-06-07 NOTE — ED PROVIDER NOTE - PROVIDER TOKENS
PROVIDER:[TOKEN:[[id number]]],PROVIDER:[TOKEN:[[id number]]],PROVIDER:[TOKEN:[[id number]]],PROVIDER:[TOKEN:[[id number]]]

## 2021-06-07 NOTE — ED PROVIDER NOTE - NEUROLOGICAL, MLM
Alert and oriented, no focal deficits, no motor or sensory deficits. Alert and oriented, no focal deficits, strength equal b/l

## 2021-06-07 NOTE — ED PROVIDER NOTE - NSFOLLOWUPCLINICS_GEN_ALL_ED_FT
[facility]  Family Medicine  [address]  [place], [place] [address]  Phone: [phone]  Fax:   Follow Up Time: 4-6 Days

## 2021-06-07 NOTE — ED PROVIDER NOTE - NSFOLLOWUPINSTRUCTIONS_ED_ALL_ED_FT
Please follow up with an orthopedist and neurologist in 3-4 days.     Back Pain    Back pain is very common in adults. The cause of back pain is rarely dangerous and the pain often gets better over time. The cause of your back pain may not be known and may include strain of muscles or ligaments, degeneration of the spinal disks, or arthritis. Occasionally the pain may radiate down your leg(s). Over-the-counter medicines to reduce pain and inflammation are often the most helpful. Stretching and remaining active frequently helps the healing process.     SEEK IMMEDIATE MEDICAL CARE IF YOU HAVE ANY OF THE FOLLOWING SYMPTOMS: bowel or bladder control problems, unusual weakness or numbness in your arms or legs, nausea or vomiting, abdominal pain, fever, dizziness/lightheadedness.        Shoulder Pain    WHAT YOU NEED TO KNOW:    Shoulder pain is a common problem that can affect your daily activities. Pain can be caused by a problem within your shoulder, such as soreness of a tendon or bursa. A tendon is a cord of tough tissue that connects your muscles to your bones. The bursa is a fluid-filled sac that acts as a cushion between a bone and a tendon. Shoulder pain may also be caused by pain that spreads to your shoulder from another part of your body.    Shoulder Anatomy         DISCHARGE INSTRUCTIONS:    Return to the emergency department if:   •You have severe pain.      •You cannot move your arm or shoulder.      •You have numbness or tingling in your shoulder or arm.      Contact your healthcare provider if:   •Your pain gets worse or does not go away with treatment.      •You have trouble moving your arm or shoulder.      •You have questions or concerns about your condition or care.      Medicines: You may need any of the following:   •Acetaminophen decreases pain and fever. It is available without a doctor's order. Ask how much to take and how often to take it. Follow directions. Read the labels of all other medicines you are using to see if they also contain acetaminophen, or ask your doctor or pharmacist. Acetaminophen can cause liver damage if not taken correctly. Do not use more than 4 grams (4,000 milligrams) total of acetaminophen in one day.       •NSAIDs, such as ibuprofen, help decrease swelling, pain, and fever. This medicine is available with or without a doctor's order. NSAIDs can cause stomach bleeding or kidney problems in certain people. If you take blood thinner medicine, always ask your healthcare provider if NSAIDs are safe for you. Always read the medicine label and follow directions.      •Take your medicine as directed. Contact your healthcare provider if you think your medicine is not helping or if you have side effects. Tell him of her if you are allergic to any medicine. Keep a list of the medicines, vitamins, and herbs you take. Include the amounts, and when and why you take them. Bring the list or the pill bottles to follow-up visits. Carry your medicine list with you in case of an emergency.      Manage your symptoms:   •Apply ice on your shoulder for 20 to 30 minutes every 2 hours or as directed. Use an ice pack, or put crushed ice in a plastic bag. Cover it with a towel before you apply it to your shoulder. Ice helps prevent tissue damage and decreases swelling and pain.      •Apply heat if ice does not help your symptoms. Apply heat on your shoulder for 20 to 30 minutes every 2 hours for as many days as directed. Heat helps decrease pain and muscle spasms.      •Limit activities as directed. Try to avoid repeated overhead movements.      •Go to physical or occupational therapy as directed. A physical therapist teaches you exercises to help improve movement and strength, and to decrease pain. An occupational therapist teaches you skills to help with your daily activities.       Prevent shoulder pain:   •Maintain a good range of motion in your shoulder. Ask your healthcare provider which exercises you should do on a regular basis after you have healed.       •Stretch and strengthen your shoulder. Use proper technique during exercises and sports.      Follow up with your healthcare provider or orthopedist as directed: Write down your questions so you remember to ask them during your visits.        © Copyright [employer] 2021

## 2021-06-07 NOTE — ED ADULT NURSE REASSESSMENT NOTE - NS ED NURSE REASSESS COMMENT FT1
Patient angry and upset and MD Acosta due to plan of care. Patient requesting another doctor, patient without discharge papers.

## 2021-06-07 NOTE — ED PROVIDER NOTE - PATIENT PORTAL LINK FT
You can access the FollowMyHealth Patient Portal offered by [facility] by registering at the following website: [email or web address]. By joining [employer]’s FollowMyHealth portal, you will also be able to view your health information using other applications (apps) compatible with our system.

## 2021-06-07 NOTE — ED PROVIDER NOTE - CLINICAL SUMMARY MEDICAL DECISION MAKING FREE TEXT BOX
39 y/o F with no PMHx, presents to the ED w/ b/l arm numbness and R arm pain s/p fall onto back in December. Strength 5/5 b/l on exam, no other weakness. Patient notes no recent orthopedic f/u and requesting treatment as notes she cannot control her pain with current medications. Patient offered pain medication but refuses as stating she does not want anything that will "put her to sleep". Patient recommended for outpatient ortho and neuro f/u, patient was unhappy, requesting MRI and specialized services in ED. Patient was advised this was a chronic issue and is not appropriate to follow in the ED and can f/u outpatient however patient became aggressive, asking for supervisors, stating will refuse to leave unless she can get all services. Patient advised she ca get pain meds but will have to f/u outpatient with neuro and ortho. Discharge paperwork printed, however patient evacuated prior to being presented paperwork. 39 y/o F with no PMHx, presents to the ED w/ b/l arm numbness and R arm pain s/p fall onto back in December. Strength 5/5 b/l on exam, no weakness. Patient notes multiple specialty f/ups in the past including "nerve testing" and requesting treatment as she notes she cannot control her pain with current medications. Patient offered pain medication but refuses as stating she does not want anything that will "put her to sleep". Patient recommended for outpatient ortho and neuro f/u, patient was unhappy, requesting MRI and specialized services in ED immediately. Patient was advised this was a chronic issue and can f/u outpatient, however patient became aggressive, asking for supervisors, stating will refuse to leave unless she can get all services. Patient advised she ca get pain meds but will have to f/u outpatient with neuro and ortho. Discharge paperwork printed, however patient left  prior to being presented paperwork.

## 2021-06-07 NOTE — ED PROVIDER NOTE - CARE PROVIDERS DIRECT ADDRESSES
,DirectAddress_Unknown,DirectAddress_Unknown,DirectAddress_Unknown,[email or web address]
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
## 2021-07-25 NOTE — ED PROVIDER NOTE - CARE PROVIDER_API CALL
Calm/Appropriate
Aquiles Smith)  Orthopaedic Surgery  [address], [address] [place] [address]  Phone: [phone]  Fax: [phone]  Follow Up Time:     Rosa Jolly)  Orthopaedic Surgery  [address], [address] [place] [address]  Phone: [phone]  Fax: [phone]  Follow Up Time:     Matthew Fiore)  Orthopaedic Surgery  [address] [place] [address]  Phone: [phone]  Fax: [phone]  Follow Up Time:     Marcin Diamond)  Neurology  [address], [address] [place] [address]  Phone: [phone]  Fax: [phone]  Follow Up Time:

## 2021-11-30 NOTE — ED ADULT TRIAGE NOTE - RESPIRATORY RATE (BREATHS/MIN)
18 Plastic Surgeon Procedure Text (A): After obtaining clear surgical margins the patient was sent to plastics for surgical repair.  The patient understands they will receive post-surgical care and follow-up from the plastic surgeon.

## 2022-03-24 NOTE — ED ADULT NURSE NOTE - SUICIDE SCREENING QUESTION 3
Hpi Title: Evaluation of Skin Lesions
No respiratory distress. No stridor, Lungs sounds clear with good aeration bilaterally.
No

## 2022-04-26 ENCOUNTER — EMERGENCY (EMERGENCY)
Facility: HOSPITAL | Age: 42
LOS: 1 days | Discharge: HOME | End: 2022-04-26
Attending: EMERGENCY MEDICINE | Admitting: EMERGENCY MEDICINE
Payer: MEDICAID

## 2022-04-26 VITALS
OXYGEN SATURATION: 99 % | HEART RATE: 68 BPM | TEMPERATURE: 98 F | SYSTOLIC BLOOD PRESSURE: 104 MMHG | DIASTOLIC BLOOD PRESSURE: 76 MMHG | RESPIRATION RATE: 18 BRPM

## 2022-04-26 VITALS
HEART RATE: 78 BPM | OXYGEN SATURATION: 98 % | HEIGHT: 67 IN | RESPIRATION RATE: 19 BRPM | TEMPERATURE: 98 F | SYSTOLIC BLOOD PRESSURE: 112 MMHG | DIASTOLIC BLOOD PRESSURE: 82 MMHG

## 2022-04-26 DIAGNOSIS — R07.89 OTHER CHEST PAIN: ICD-10-CM

## 2022-04-26 DIAGNOSIS — Z20.822 CONTACT WITH AND (SUSPECTED) EXPOSURE TO COVID-19: ICD-10-CM

## 2022-04-26 DIAGNOSIS — R51.9 HEADACHE, UNSPECIFIED: ICD-10-CM

## 2022-04-26 DIAGNOSIS — Z90.49 ACQUIRED ABSENCE OF OTHER SPECIFIED PARTS OF DIGESTIVE TRACT: ICD-10-CM

## 2022-04-26 DIAGNOSIS — G89.29 OTHER CHRONIC PAIN: ICD-10-CM

## 2022-04-26 DIAGNOSIS — Z87.19 PERSONAL HISTORY OF OTHER DISEASES OF THE DIGESTIVE SYSTEM: ICD-10-CM

## 2022-04-26 DIAGNOSIS — J45.909 UNSPECIFIED ASTHMA, UNCOMPLICATED: ICD-10-CM

## 2022-04-26 DIAGNOSIS — M54.2 CERVICALGIA: ICD-10-CM

## 2022-04-26 DIAGNOSIS — I83.90 ASYMPTOMATIC VARICOSE VEINS OF UNSPECIFIED LOWER EXTREMITY: ICD-10-CM

## 2022-04-26 DIAGNOSIS — Z90.721 ACQUIRED ABSENCE OF OVARIES, UNILATERAL: ICD-10-CM

## 2022-04-26 DIAGNOSIS — E03.9 HYPOTHYROIDISM, UNSPECIFIED: ICD-10-CM

## 2022-04-26 DIAGNOSIS — Z88.0 ALLERGY STATUS TO PENICILLIN: ICD-10-CM

## 2022-04-26 LAB
ALBUMIN SERPL ELPH-MCNC: 4.1 G/DL — SIGNIFICANT CHANGE UP (ref 3.3–5)
ALP SERPL-CCNC: 53 U/L — SIGNIFICANT CHANGE UP (ref 40–120)
ALT FLD-CCNC: 9 U/L — LOW (ref 10–45)
ANION GAP SERPL CALC-SCNC: 9 MMOL/L — SIGNIFICANT CHANGE UP (ref 5–17)
APPEARANCE UR: CLEAR — SIGNIFICANT CHANGE UP
APTT BLD: 33.3 SEC — SIGNIFICANT CHANGE UP (ref 27.5–35.5)
AST SERPL-CCNC: 12 U/L — SIGNIFICANT CHANGE UP (ref 10–40)
BACTERIA # UR AUTO: PRESENT /HPF
BASOPHILS # BLD AUTO: 0.04 K/UL — SIGNIFICANT CHANGE UP (ref 0–0.2)
BASOPHILS NFR BLD AUTO: 0.6 % — SIGNIFICANT CHANGE UP (ref 0–2)
BILIRUB SERPL-MCNC: 0.3 MG/DL — SIGNIFICANT CHANGE UP (ref 0.2–1.2)
BILIRUB UR-MCNC: NEGATIVE — SIGNIFICANT CHANGE UP
BUN SERPL-MCNC: 7 MG/DL — SIGNIFICANT CHANGE UP (ref 7–23)
CALCIUM SERPL-MCNC: 9.2 MG/DL — SIGNIFICANT CHANGE UP (ref 8.4–10.5)
CHLORIDE SERPL-SCNC: 105 MMOL/L — SIGNIFICANT CHANGE UP (ref 96–108)
CO2 SERPL-SCNC: 23 MMOL/L — SIGNIFICANT CHANGE UP (ref 22–31)
COLOR SPEC: YELLOW — SIGNIFICANT CHANGE UP
CREAT SERPL-MCNC: 0.69 MG/DL — SIGNIFICANT CHANGE UP (ref 0.5–1.3)
DIFF PNL FLD: ABNORMAL
EGFR: 110 ML/MIN/1.73M2 — SIGNIFICANT CHANGE UP
EOSINOPHIL # BLD AUTO: 0.15 K/UL — SIGNIFICANT CHANGE UP (ref 0–0.5)
EOSINOPHIL NFR BLD AUTO: 2.2 % — SIGNIFICANT CHANGE UP (ref 0–6)
EPI CELLS # UR: ABNORMAL /HPF (ref 0–5)
GLUCOSE SERPL-MCNC: 97 MG/DL — SIGNIFICANT CHANGE UP (ref 70–99)
GLUCOSE UR QL: NEGATIVE — SIGNIFICANT CHANGE UP
HCT VFR BLD CALC: 42.1 % — SIGNIFICANT CHANGE UP (ref 34.5–45)
HGB BLD-MCNC: 13.6 G/DL — SIGNIFICANT CHANGE UP (ref 11.5–15.5)
IMM GRANULOCYTES NFR BLD AUTO: 0.3 % — SIGNIFICANT CHANGE UP (ref 0–1.5)
INR BLD: 1.02 — SIGNIFICANT CHANGE UP (ref 0.88–1.16)
KETONES UR-MCNC: NEGATIVE — SIGNIFICANT CHANGE UP
LEUKOCYTE ESTERASE UR-ACNC: NEGATIVE — SIGNIFICANT CHANGE UP
LIDOCAIN IGE QN: 35 U/L — SIGNIFICANT CHANGE UP (ref 7–60)
LYMPHOCYTES # BLD AUTO: 2.03 K/UL — SIGNIFICANT CHANGE UP (ref 1–3.3)
LYMPHOCYTES # BLD AUTO: 30.4 % — SIGNIFICANT CHANGE UP (ref 13–44)
MAGNESIUM SERPL-MCNC: 1.9 MG/DL — SIGNIFICANT CHANGE UP (ref 1.6–2.6)
MCHC RBC-ENTMCNC: 26.5 PG — LOW (ref 27–34)
MCHC RBC-ENTMCNC: 32.3 GM/DL — SIGNIFICANT CHANGE UP (ref 32–36)
MCV RBC AUTO: 81.9 FL — SIGNIFICANT CHANGE UP (ref 80–100)
MONOCYTES # BLD AUTO: 0.44 K/UL — SIGNIFICANT CHANGE UP (ref 0–0.9)
MONOCYTES NFR BLD AUTO: 6.6 % — SIGNIFICANT CHANGE UP (ref 2–14)
NEUTROPHILS # BLD AUTO: 4 K/UL — SIGNIFICANT CHANGE UP (ref 1.8–7.4)
NEUTROPHILS NFR BLD AUTO: 59.9 % — SIGNIFICANT CHANGE UP (ref 43–77)
NITRITE UR-MCNC: NEGATIVE — SIGNIFICANT CHANGE UP
NRBC # BLD: 0 /100 WBCS — SIGNIFICANT CHANGE UP (ref 0–0)
NT-PROBNP SERPL-SCNC: 24 PG/ML — SIGNIFICANT CHANGE UP (ref 0–300)
PH UR: 6 — SIGNIFICANT CHANGE UP (ref 5–8)
PLATELET # BLD AUTO: 258 K/UL — SIGNIFICANT CHANGE UP (ref 150–400)
POTASSIUM SERPL-MCNC: 4.1 MMOL/L — SIGNIFICANT CHANGE UP (ref 3.5–5.3)
POTASSIUM SERPL-SCNC: 4.1 MMOL/L — SIGNIFICANT CHANGE UP (ref 3.5–5.3)
PROT SERPL-MCNC: 7 G/DL — SIGNIFICANT CHANGE UP (ref 6–8.3)
PROT UR-MCNC: NEGATIVE MG/DL — SIGNIFICANT CHANGE UP
PROTHROM AB SERPL-ACNC: 12.1 SEC — SIGNIFICANT CHANGE UP (ref 10.5–13.4)
RBC # BLD: 5.14 M/UL — SIGNIFICANT CHANGE UP (ref 3.8–5.2)
RBC # FLD: 13.7 % — SIGNIFICANT CHANGE UP (ref 10.3–14.5)
RBC CASTS # UR COMP ASSIST: < 5 /HPF — SIGNIFICANT CHANGE UP
SARS-COV-2 RNA SPEC QL NAA+PROBE: NEGATIVE — SIGNIFICANT CHANGE UP
SODIUM SERPL-SCNC: 137 MMOL/L — SIGNIFICANT CHANGE UP (ref 135–145)
SP GR SPEC: <=1.005 — SIGNIFICANT CHANGE UP (ref 1–1.03)
TROPONIN T SERPL-MCNC: <0.01 NG/ML — SIGNIFICANT CHANGE UP (ref 0–0.01)
UROBILINOGEN FLD QL: 0.2 E.U./DL — SIGNIFICANT CHANGE UP
WBC # BLD: 6.68 K/UL — SIGNIFICANT CHANGE UP (ref 3.8–10.5)
WBC # FLD AUTO: 6.68 K/UL — SIGNIFICANT CHANGE UP (ref 3.8–10.5)
WBC UR QL: < 5 /HPF — SIGNIFICANT CHANGE UP

## 2022-04-26 PROCEDURE — 93010 ELECTROCARDIOGRAM REPORT: CPT

## 2022-04-26 PROCEDURE — 80053 COMPREHEN METABOLIC PANEL: CPT

## 2022-04-26 PROCEDURE — 71045 X-RAY EXAM CHEST 1 VIEW: CPT | Mod: 26

## 2022-04-26 PROCEDURE — 96375 TX/PRO/DX INJ NEW DRUG ADDON: CPT

## 2022-04-26 PROCEDURE — 83880 ASSAY OF NATRIURETIC PEPTIDE: CPT

## 2022-04-26 PROCEDURE — 94640 AIRWAY INHALATION TREATMENT: CPT

## 2022-04-26 PROCEDURE — 93005 ELECTROCARDIOGRAM TRACING: CPT

## 2022-04-26 PROCEDURE — 96374 THER/PROPH/DIAG INJ IV PUSH: CPT

## 2022-04-26 PROCEDURE — 82962 GLUCOSE BLOOD TEST: CPT

## 2022-04-26 PROCEDURE — 99285 EMERGENCY DEPT VISIT HI MDM: CPT | Mod: 25

## 2022-04-26 PROCEDURE — 85730 THROMBOPLASTIN TIME PARTIAL: CPT

## 2022-04-26 PROCEDURE — 83735 ASSAY OF MAGNESIUM: CPT

## 2022-04-26 PROCEDURE — 87635 SARS-COV-2 COVID-19 AMP PRB: CPT

## 2022-04-26 PROCEDURE — 99285 EMERGENCY DEPT VISIT HI MDM: CPT

## 2022-04-26 PROCEDURE — 81001 URINALYSIS AUTO W/SCOPE: CPT

## 2022-04-26 PROCEDURE — 36415 COLL VENOUS BLD VENIPUNCTURE: CPT

## 2022-04-26 PROCEDURE — 84484 ASSAY OF TROPONIN QUANT: CPT

## 2022-04-26 PROCEDURE — 71045 X-RAY EXAM CHEST 1 VIEW: CPT

## 2022-04-26 PROCEDURE — 85025 COMPLETE CBC W/AUTO DIFF WBC: CPT

## 2022-04-26 PROCEDURE — 83690 ASSAY OF LIPASE: CPT

## 2022-04-26 PROCEDURE — 85610 PROTHROMBIN TIME: CPT

## 2022-04-26 RX ORDER — KETOROLAC TROMETHAMINE 30 MG/ML
30 SYRINGE (ML) INJECTION ONCE
Refills: 0 | Status: DISCONTINUED | OUTPATIENT
Start: 2022-04-26 | End: 2022-04-26

## 2022-04-26 RX ORDER — METOCLOPRAMIDE HCL 10 MG
10 TABLET ORAL ONCE
Refills: 0 | Status: COMPLETED | OUTPATIENT
Start: 2022-04-26 | End: 2022-04-26

## 2022-04-26 RX ORDER — SODIUM CHLORIDE 9 MG/ML
1000 INJECTION INTRAMUSCULAR; INTRAVENOUS; SUBCUTANEOUS ONCE
Refills: 0 | Status: COMPLETED | OUTPATIENT
Start: 2022-04-26 | End: 2022-04-26

## 2022-04-26 RX ORDER — METOCLOPRAMIDE HCL 10 MG
10 TABLET ORAL ONCE
Refills: 0 | Status: DISCONTINUED | OUTPATIENT
Start: 2022-04-26 | End: 2022-04-26

## 2022-04-26 RX ORDER — IPRATROPIUM/ALBUTEROL SULFATE 18-103MCG
3 AEROSOL WITH ADAPTER (GRAM) INHALATION ONCE
Refills: 0 | Status: COMPLETED | OUTPATIENT
Start: 2022-04-26 | End: 2022-04-26

## 2022-04-26 RX ADMIN — Medication 30 MILLIGRAM(S): at 10:28

## 2022-04-26 RX ADMIN — SODIUM CHLORIDE 2000 MILLILITER(S): 9 INJECTION INTRAMUSCULAR; INTRAVENOUS; SUBCUTANEOUS at 10:27

## 2022-04-26 RX ADMIN — Medication 104 MILLIGRAM(S): at 10:31

## 2022-04-26 RX ADMIN — Medication 3 MILLILITER(S): at 10:36

## 2022-04-26 RX ADMIN — Medication 3 MILLILITER(S): at 10:47

## 2022-04-26 NOTE — ED PROVIDER NOTE - OBJECTIVE STATEMENT
40 y/o F with PSHx of PID surgery, L ectopic pregnancy w/ oophorectomy, appendectomy, Hx of Asthma (uses pump PRN), chronic back pain, hyperthyroidism (no meds), varicose veins w/ hx sclerotherapy presents to the ED with L sided chest pain that began ~1w ago and has been constant since yesterday. Patient also notes L sided headache and L neck pain. Pain describes as pressure-like sensation over L side of chest, notes has been rubbing area due to pain. No falls, trauma, injury, no nausea, vomiting, diarrhea, fever, chills, cough, SOB, dizziness. Patient notes she recently has been in a lot of stress as her mother was having cardiac surgery. Patient took 1 Tylenol this morning and EMS gave 2 aspirin w/o improvement of symptoms. Seen 3y ago by cardiologist and had CT angio cardiac w/ IV contrast in 2019, negative, 0 calcium score. 40 y/o F with PSHx of surgery sec to PID, L ectopic pregnancy w/ oophorectomy, appendectomy, Hx of Asthma (uses pump PRN), chronic back pain, hyperthyroidism (no meds), varicose veins w/ hx sclerotherapy presents to the ED with L sided chest pain that began ~1w ago and has been constant since yesterday. Patient also notes L sided headache and L neck pain. Pain describes as pressure-like sensation over L side of chest, notes has been rubbing area due to pain. No falls, trauma, injury, no nausea, vomiting, diarrhea, fever, chills, cough, SOB, dizziness. Patient notes she recently has been in a lot of stress as her mother was having cardiac surgery. Patient took 1 Tylenol this morning and EMS gave 2 aspirin w/o improvement of symptoms. Seen 3y ago by cardiologist and had CT angio cardiac w/ IV contrast in 2019, negative, 0 calcium score.

## 2022-04-26 NOTE — ED ADULT TRIAGE NOTE - CHIEF COMPLAINT QUOTE
Pt reports chest pain since yesterday radiating to left arm. Reports nausea, one episode of diarrhea. Given 324 ASA by EMS. Hx of asthma.

## 2022-04-26 NOTE — ED PROVIDER NOTE - CARE PROVIDER_API CALL
Judith Reyna  CARDIOVASCULAR DISEASE  [address] [address]  Phone: [phone]  Fax: [phone]  Follow Up Time:

## 2022-04-26 NOTE — ED ADULT NURSE NOTE - OBJECTIVE STATEMENT
Patient presents to the ED complaining of chest pain for two days. Patient with history of asthma. Denies cough or fever.

## 2022-04-26 NOTE — ED PROVIDER NOTE - CLINICAL SUMMARY MEDICAL DECISION MAKING FREE TEXT BOX
40 y/o F with PSHx of PID surgery, ecopic pregnancy w/ oophorectomy, appendectomy, asthma, back pain, presents w/ L sided chest pain x1w, constant since yesterday. Also with L sided headache/L neck pain. Old charts reviewed and patient had CT angio cardiac w/ IV contrast in 2019, negative.     ED Course: VS noted, patient afebrile, rest of VS wnl, will do cardiac w/u including CXR, EKG, Labs, will also treat patient for headache/migraine with Reglan, Toradol, IV fluids, will reassess. Will give Duoneb x2 as patient with rhonchi and hx of asthma. 42 y/o F with PSHx of PID surgery, ecopic pregnancy w/ oophorectomy, appendectomy, asthma, back pain, presents w/ L sided chest pain x1w, constant since yesterday. Also with L sided headache/L neck pain. Old charts reviewed and patient had CT angio cardiac w/ IV contrast in 2019, negative.     ED Course: VS noted, patient afebrile, rest of VS wnl, will do cardiac w/u including CXR, EKG, Labs, will also treat patient for headache/migraine with Reglan, Toradol, IV fluids, will reassess. Will give Duoneb x2 as patient with rhonchi and hx of asthma.  Labs/ studies noted and with NAD. ECG and CXR with NAD. Trop negative. Pt feeling better post nebs. Non-toxic appearing and stable for discharge. To follow up outpatient. Strict return precautions given.

## 2022-04-26 NOTE — ED PROVIDER NOTE - PATIENT PORTAL LINK FT
You can access the FollowMyHealth Patient Portal offered by [facility] by registering at the following website: [email or web address]. By joining [employer]’s FollowMyHealth portal, you will also be able to view your health information using other applications (apps) compatible with our system.

## 2022-04-26 NOTE — ED PROVIDER NOTE - NSICDXPASTMEDICALHX_GEN_ALL_CORE_FT
PAST MEDICAL HISTORY:  Asthma     GERD (gastroesophageal reflux disease)     Hypothyroidism     Migraines

## 2022-04-26 NOTE — ED PROVIDER NOTE - NSFOLLOWUPINSTRUCTIONS_ED_ALL_ED_FT
Please follow up with Dr. Reyna in 3-7 days.   Return to the ER if you develop any concerning symptoms.       Chest Pain    Chest pain can be caused by many different conditions which may or may not be dangerous. Causes include heartburn, lung infections, heart attack, blood clot in lungs, skin infections, strain or damage to muscle, cartilage, or bones, etc. In addition to a history and physical examination, an electrocardiogram (ECG) or other lab tests may have been performed to determine the cause of your chest pain. Follow up with your primary care provider or with a cardiologist as instructed.     SEEK IMMEDIATE MEDICAL CARE IF YOU HAVE ANY OF THE FOLLOWING SYMPTOMS: worsening chest pain, coughing up blood, unexplained back/neck/jaw pain, severe abdominal pain, dizziness or lightheadedness, fainting, shortness of breath, sweaty or clammy skin, vomiting, or racing heart beat. These symptoms may represent a serious problem that is an emergency. Do not wait to see if the symptoms will go away. Get medical help right away. Call 911 and do not drive yourself to the hospital.

## 2022-04-26 NOTE — ED PROVIDER NOTE - PHYSICAL EXAMINATION
CONSTITUTIONAL: Well appearing, awake, alert, oriented and in no apparent distress.  ENMT: Airway patent.  EYES: Clear bilaterally.  CARDIAC: Normal rate, regular rhythm.  Heart sounds S1, S2. TTP to L sided chest wall, lateral of sternum, w/ erythema of skin noted (pt notes she has been rubbing area)   RESPIRATORY: Scattered rhonchi.  GASTROINTESTINAL: Abdomen soft, non-tender, no guarding.  MUSCULOSKELETAL: Spine appears normal, range of motion is not limited, no muscle or joint tenderness  NEUROLOGICAL: Alert and oriented, no focal deficits, no motor or sensory deficits.  SKIN: Skin normal color for race, warm, dry and intact. No evidence of rash.  PSYCHIATRIC: Alert and oriented. normal mood and affect. no apparent risk to self or others. CONSTITUTIONAL: Well appearing, awake, alert, oriented and in no apparent distress.  ENMT: Airway patent.  EYES: Clear bilaterally.  CARDIAC: Normal rate, regular rhythm.  Heart sounds S1, S2. TTP to L sided chest wall, lateral of sternum, w/ erythema of skin noted (pt notes she has been rubbing area)   RESPIRATORY: Scattered rhonchi.  GASTROINTESTINAL: Abdomen soft, non-tender, no guarding.  MUSCULOSKELETAL: Spine appears normal, range of motion is not limited, no muscle or joint tenderness  NEUROLOGICAL: Alert and oriented, no focal deficits, no motor or sensory deficits.  SKIN: Skin normal color for race, warm, dry and intact.  see Cardiac exam.   PSYCHIATRIC: Alert and oriented. normal mood and affect. no apparent risk to self or others.

## 2022-09-30 NOTE — ED ADULT NURSE NOTE - OBJECTIVE STATEMENT
37 y/o F a&ox3 walked in from front triage with a steady gait c/o multiple medical c/o. Pt reports muscle aches x 8 days. Pt states " the pain starts in my abdomen, goes to my back and then goes to my neck."  reports muscle spasms worsened with ambulation. chills x 2 days. afebrile now. Midline abd pain upon palpation. yellow diarrhea x2 days. Hematuria x 2 days. LMP 4/1/19. denies nausea/ vomiting, SOB, chest pain. dec eating x 2 days.
no

## 2023-01-14 ENCOUNTER — EMERGENCY (EMERGENCY)
Facility: HOSPITAL | Age: 43
LOS: 1 days | Discharge: HOME | End: 2023-01-14
Attending: EMERGENCY MEDICINE | Admitting: EMERGENCY MEDICINE
Payer: MEDICAID

## 2023-01-14 VITALS
SYSTOLIC BLOOD PRESSURE: 119 MMHG | HEIGHT: 66 IN | WEIGHT: 203.05 LBS | RESPIRATION RATE: 18 BRPM | DIASTOLIC BLOOD PRESSURE: 57 MMHG | HEART RATE: 76 BPM | TEMPERATURE: 98 F | OXYGEN SATURATION: 99 %

## 2023-01-14 VITALS
SYSTOLIC BLOOD PRESSURE: 117 MMHG | HEART RATE: 77 BPM | DIASTOLIC BLOOD PRESSURE: 73 MMHG | RESPIRATION RATE: 16 BRPM | OXYGEN SATURATION: 100 %

## 2023-01-14 DIAGNOSIS — Z90.79 ACQUIRED ABSENCE OF OTHER GENITAL ORGAN(S): ICD-10-CM

## 2023-01-14 DIAGNOSIS — Z87.442 PERSONAL HISTORY OF URINARY CALCULI: ICD-10-CM

## 2023-01-14 DIAGNOSIS — Z90.49 ACQUIRED ABSENCE OF OTHER SPECIFIED PARTS OF DIGESTIVE TRACT: ICD-10-CM

## 2023-01-14 DIAGNOSIS — Z88.0 ALLERGY STATUS TO PENICILLIN: ICD-10-CM

## 2023-01-14 DIAGNOSIS — G43.909 MIGRAINE, UNSPECIFIED, NOT INTRACTABLE, WITHOUT STATUS MIGRAINOSUS: ICD-10-CM

## 2023-01-14 DIAGNOSIS — R31.9 HEMATURIA, UNSPECIFIED: ICD-10-CM

## 2023-01-14 DIAGNOSIS — Z87.42 PERSONAL HISTORY OF OTHER DISEASES OF THE FEMALE GENITAL TRACT: ICD-10-CM

## 2023-01-14 DIAGNOSIS — R11.2 NAUSEA WITH VOMITING, UNSPECIFIED: ICD-10-CM

## 2023-01-14 DIAGNOSIS — E03.9 HYPOTHYROIDISM, UNSPECIFIED: ICD-10-CM

## 2023-01-14 DIAGNOSIS — R19.7 DIARRHEA, UNSPECIFIED: ICD-10-CM

## 2023-01-14 DIAGNOSIS — Z87.59 PERSONAL HISTORY OF OTHER COMPLICATIONS OF PREGNANCY, CHILDBIRTH AND THE PUERPERIUM: ICD-10-CM

## 2023-01-14 DIAGNOSIS — J45.909 UNSPECIFIED ASTHMA, UNCOMPLICATED: ICD-10-CM

## 2023-01-14 DIAGNOSIS — Z87.19 PERSONAL HISTORY OF OTHER DISEASES OF THE DIGESTIVE SYSTEM: ICD-10-CM

## 2023-01-14 DIAGNOSIS — R10.9 UNSPECIFIED ABDOMINAL PAIN: ICD-10-CM

## 2023-01-14 DIAGNOSIS — R10.32 LEFT LOWER QUADRANT PAIN: ICD-10-CM

## 2023-01-14 LAB
ALBUMIN SERPL ELPH-MCNC: 4 G/DL — SIGNIFICANT CHANGE UP (ref 3.3–5)
ALP SERPL-CCNC: 62 U/L — SIGNIFICANT CHANGE UP (ref 40–120)
ALT FLD-CCNC: 9 U/L — LOW (ref 10–45)
ANION GAP SERPL CALC-SCNC: 6 MMOL/L — SIGNIFICANT CHANGE UP (ref 5–17)
APPEARANCE UR: CLEAR — SIGNIFICANT CHANGE UP
AST SERPL-CCNC: 14 U/L — SIGNIFICANT CHANGE UP (ref 10–40)
BACTERIA # UR AUTO: PRESENT /HPF
BASOPHILS # BLD AUTO: 0.03 K/UL — SIGNIFICANT CHANGE UP (ref 0–0.2)
BASOPHILS NFR BLD AUTO: 0.3 % — SIGNIFICANT CHANGE UP (ref 0–2)
BILIRUB SERPL-MCNC: 0.8 MG/DL — SIGNIFICANT CHANGE UP (ref 0.2–1.2)
BILIRUB UR-MCNC: NEGATIVE — SIGNIFICANT CHANGE UP
BUN SERPL-MCNC: 9 MG/DL — SIGNIFICANT CHANGE UP (ref 7–23)
CALCIUM SERPL-MCNC: 8.6 MG/DL — SIGNIFICANT CHANGE UP (ref 8.4–10.5)
CHLORIDE SERPL-SCNC: 107 MMOL/L — SIGNIFICANT CHANGE UP (ref 96–108)
CO2 SERPL-SCNC: 25 MMOL/L — SIGNIFICANT CHANGE UP (ref 22–31)
COLOR SPEC: YELLOW — SIGNIFICANT CHANGE UP
CREAT SERPL-MCNC: 0.67 MG/DL — SIGNIFICANT CHANGE UP (ref 0.5–1.3)
DIFF PNL FLD: ABNORMAL
EGFR: 110 ML/MIN/1.73M2 — SIGNIFICANT CHANGE UP
EOSINOPHIL # BLD AUTO: 0.14 K/UL — SIGNIFICANT CHANGE UP (ref 0–0.5)
EOSINOPHIL NFR BLD AUTO: 1.5 % — SIGNIFICANT CHANGE UP (ref 0–6)
EPI CELLS # UR: SIGNIFICANT CHANGE UP /HPF (ref 0–5)
GLUCOSE SERPL-MCNC: 107 MG/DL — HIGH (ref 70–99)
GLUCOSE UR QL: NEGATIVE — SIGNIFICANT CHANGE UP
HCG SERPL-ACNC: <0 MIU/ML — SIGNIFICANT CHANGE UP
HCT VFR BLD CALC: 44.8 % — SIGNIFICANT CHANGE UP (ref 34.5–45)
HGB BLD-MCNC: 14.4 G/DL — SIGNIFICANT CHANGE UP (ref 11.5–15.5)
IMM GRANULOCYTES NFR BLD AUTO: 0.3 % — SIGNIFICANT CHANGE UP (ref 0–0.9)
KETONES UR-MCNC: NEGATIVE — SIGNIFICANT CHANGE UP
LEUKOCYTE ESTERASE UR-ACNC: NEGATIVE — SIGNIFICANT CHANGE UP
LIDOCAIN IGE QN: 20 U/L — SIGNIFICANT CHANGE UP (ref 7–60)
LYMPHOCYTES # BLD AUTO: 0.64 K/UL — LOW (ref 1–3.3)
LYMPHOCYTES # BLD AUTO: 7 % — LOW (ref 13–44)
MCHC RBC-ENTMCNC: 26.4 PG — LOW (ref 27–34)
MCHC RBC-ENTMCNC: 32.1 GM/DL — SIGNIFICANT CHANGE UP (ref 32–36)
MCV RBC AUTO: 82.2 FL — SIGNIFICANT CHANGE UP (ref 80–100)
MONOCYTES # BLD AUTO: 0.36 K/UL — SIGNIFICANT CHANGE UP (ref 0–0.9)
MONOCYTES NFR BLD AUTO: 3.9 % — SIGNIFICANT CHANGE UP (ref 2–14)
NEUTROPHILS # BLD AUTO: 7.97 K/UL — HIGH (ref 1.8–7.4)
NEUTROPHILS NFR BLD AUTO: 87 % — HIGH (ref 43–77)
NITRITE UR-MCNC: NEGATIVE — SIGNIFICANT CHANGE UP
NRBC # BLD: 0 /100 WBCS — SIGNIFICANT CHANGE UP (ref 0–0)
PH UR: 6 — SIGNIFICANT CHANGE UP (ref 5–8)
PLATELET # BLD AUTO: 252 K/UL — SIGNIFICANT CHANGE UP (ref 150–400)
POTASSIUM SERPL-MCNC: 4.2 MMOL/L — SIGNIFICANT CHANGE UP (ref 3.5–5.3)
POTASSIUM SERPL-SCNC: 4.2 MMOL/L — SIGNIFICANT CHANGE UP (ref 3.5–5.3)
PROT SERPL-MCNC: 7.1 G/DL — SIGNIFICANT CHANGE UP (ref 6–8.3)
PROT UR-MCNC: NEGATIVE MG/DL — SIGNIFICANT CHANGE UP
RBC # BLD: 5.45 M/UL — HIGH (ref 3.8–5.2)
RBC # FLD: 13.2 % — SIGNIFICANT CHANGE UP (ref 10.3–14.5)
RBC CASTS # UR COMP ASSIST: < 5 /HPF — SIGNIFICANT CHANGE UP
SODIUM SERPL-SCNC: 138 MMOL/L — SIGNIFICANT CHANGE UP (ref 135–145)
SP GR SPEC: 1.02 — SIGNIFICANT CHANGE UP (ref 1–1.03)
UROBILINOGEN FLD QL: 0.2 E.U./DL — SIGNIFICANT CHANGE UP
WBC # BLD: 9.17 K/UL — SIGNIFICANT CHANGE UP (ref 3.8–10.5)
WBC # FLD AUTO: 9.17 K/UL — SIGNIFICANT CHANGE UP (ref 3.8–10.5)
WBC UR QL: < 5 /HPF — SIGNIFICANT CHANGE UP

## 2023-01-14 PROCEDURE — 36415 COLL VENOUS BLD VENIPUNCTURE: CPT

## 2023-01-14 PROCEDURE — 74177 CT ABD & PELVIS W/CONTRAST: CPT | Mod: 26,MG

## 2023-01-14 PROCEDURE — 74177 CT ABD & PELVIS W/CONTRAST: CPT | Mod: MG

## 2023-01-14 PROCEDURE — 96365 THER/PROPH/DIAG IV INF INIT: CPT | Mod: XU

## 2023-01-14 PROCEDURE — 99284 EMERGENCY DEPT VISIT MOD MDM: CPT

## 2023-01-14 PROCEDURE — 99284 EMERGENCY DEPT VISIT MOD MDM: CPT | Mod: 25

## 2023-01-14 PROCEDURE — 84702 CHORIONIC GONADOTROPIN TEST: CPT

## 2023-01-14 PROCEDURE — G1004: CPT

## 2023-01-14 PROCEDURE — 83690 ASSAY OF LIPASE: CPT

## 2023-01-14 PROCEDURE — 81001 URINALYSIS AUTO W/SCOPE: CPT

## 2023-01-14 PROCEDURE — 85025 COMPLETE CBC W/AUTO DIFF WBC: CPT

## 2023-01-14 PROCEDURE — 96375 TX/PRO/DX INJ NEW DRUG ADDON: CPT

## 2023-01-14 PROCEDURE — 80053 COMPREHEN METABOLIC PANEL: CPT

## 2023-01-14 RX ORDER — FAMOTIDINE 10 MG/ML
20 INJECTION INTRAVENOUS ONCE
Refills: 0 | Status: COMPLETED | OUTPATIENT
Start: 2023-01-14 | End: 2023-01-14

## 2023-01-14 RX ORDER — ACETAMINOPHEN 500 MG
1000 TABLET ORAL ONCE
Refills: 0 | Status: COMPLETED | OUTPATIENT
Start: 2023-01-14 | End: 2023-01-14

## 2023-01-14 RX ORDER — METOCLOPRAMIDE HCL 10 MG
10 TABLET ORAL ONCE
Refills: 0 | Status: COMPLETED | OUTPATIENT
Start: 2023-01-14 | End: 2023-01-14

## 2023-01-14 RX ORDER — DIATRIZOATE MEGLUMINE 180 MG/ML
30 INJECTION, SOLUTION INTRAVESICAL ONCE
Refills: 0 | Status: COMPLETED | OUTPATIENT
Start: 2023-01-14 | End: 2023-01-14

## 2023-01-14 RX ORDER — KETOROLAC TROMETHAMINE 30 MG/ML
15 SYRINGE (ML) INJECTION ONCE
Refills: 0 | Status: DISCONTINUED | OUTPATIENT
Start: 2023-01-14 | End: 2023-01-14

## 2023-01-14 RX ORDER — ONDANSETRON 8 MG/1
4 TABLET, FILM COATED ORAL ONCE
Refills: 0 | Status: COMPLETED | OUTPATIENT
Start: 2023-01-14 | End: 2023-01-14

## 2023-01-14 RX ORDER — SODIUM CHLORIDE 9 MG/ML
1000 INJECTION INTRAMUSCULAR; INTRAVENOUS; SUBCUTANEOUS ONCE
Refills: 0 | Status: COMPLETED | OUTPATIENT
Start: 2023-01-14 | End: 2023-01-14

## 2023-01-14 RX ADMIN — Medication 10 MILLIGRAM(S): at 15:05

## 2023-01-14 RX ADMIN — Medication 10 MILLILITER(S): at 18:14

## 2023-01-14 RX ADMIN — Medication 15 MILLIGRAM(S): at 17:52

## 2023-01-14 RX ADMIN — Medication 1000 MILLIGRAM(S): at 16:19

## 2023-01-14 RX ADMIN — ONDANSETRON 4 MILLIGRAM(S): 8 TABLET, FILM COATED ORAL at 18:09

## 2023-01-14 RX ADMIN — Medication 30 MILLILITER(S): at 18:09

## 2023-01-14 RX ADMIN — DIATRIZOATE MEGLUMINE 30 MILLILITER(S): 180 INJECTION, SOLUTION INTRAVESICAL at 15:05

## 2023-01-14 RX ADMIN — FAMOTIDINE 20 MILLIGRAM(S): 10 INJECTION INTRAVENOUS at 18:09

## 2023-01-14 RX ADMIN — SODIUM CHLORIDE 1000 MILLILITER(S): 9 INJECTION INTRAMUSCULAR; INTRAVENOUS; SUBCUTANEOUS at 15:06

## 2023-01-14 RX ADMIN — Medication 400 MILLIGRAM(S): at 15:08

## 2023-01-14 NOTE — ED ADULT TRIAGE NOTE - CHIEF COMPLAINT QUOTE
41 y/o female c/o abdominal pain, nausea, vomiting and diarrhea since 0900 today, right flank pain with hematuria.

## 2023-01-14 NOTE — ED ADULT NURSE NOTE - CHIEF COMPLAINT QUOTE
43 y/o female c/o abdominal pain, nausea, vomiting and diarrhea since 0900 today, right flank pain with hematuria.

## 2023-01-14 NOTE — ED PROVIDER NOTE - OBJECTIVE STATEMENT
43 y/o f hx hypothyroidism presents c/o left side abd pain, n/v/d since last night.  Pt reports seeing blood when she wiped after urinating today also.  Pt tried taking oral zofran this morning which she states did not improve her nausea.  Denies fever, chills, urinary sx, hematochezia, hematemesis, all other ROS negative.

## 2023-01-14 NOTE — ED ADULT NURSE NOTE - OBJECTIVE STATEMENT
Patient a+o x4 c/o left flank pain since this morning, states "feels similar to when I found out I had a 20 week old pregnancy and had to remove my right tube during the pandemic". States had a little toast and vomited this morning, unable to tolerate po. Reports noticing "light pink" vaginal bleeding. Received from ems with IV to right arm with IVF. Physician at bedside.

## 2023-01-14 NOTE — ED PROVIDER NOTE - PATIENT PORTAL LINK FT
You can access the FollowMyHealth Patient Portal offered by [facility] by registering at the following website: [email or web address]. By joining [employer]’s FollowMyHealth portal, you will also be able to view your health information using other applications (apps) compatible with our system.

## 2023-01-14 NOTE — ED PROVIDER NOTE - ATTENDING APP SHARED VISIT CONTRIBUTION OF CARE
Pt w/ PMHx hypothyroidism, PSHx TOA s/p laparotomy, appendectomy, ectopic pregnancy and pt reports R salpingectomy (confirmed on charting L) p/w L sided abd pain, onset yesterday, n/v/d. She also reports blood when wiping after urinating. She reports she does not see it in the stool, only when she urinates. She has vomited several times, loose BMs, neither w/ blood. She had similar sx in 2021, found to have a 20 week ectopic, and lost the tube. Denies dysuria. + hx renal colic, does not feel similar. Does not think she is pregnant now.   On chart reviewed - pt had L ectopic confirmed and MRI and had L salpingectomy Pt w/ PMHx hypothyroidism, PSHx TOA s/p laparotomy, appendectomy, ectopic pregnancy and pt reports R salpingectomy (confirmed on charting L) p/w L sided abd pain, onset yesterday, n/v/d. She also reports blood when wiping after urinating. She reports she does not see it in the stool, only when she urinates. She has vomited several times, loose BMs, neither w/ blood. She had similar sx in 2021, found to have a 20 week ectopic, and lost the tube. Denies dysuria. + hx renal colic, does not feel similar. Does not think she is pregnant now.   On chart reviewed - pt had L ectopic confirmed and MRI and had L salpingectomy.  Constitutional: Well appearing, awake, alert, oriented to person, place, time/situation and in no apparent distress.  ENMT: Airway patent.   Eyes: Clear bilaterally  Cardiac: Normal rate, regular rhythm.   Respiratory: No increased WOB, tachypnea, hypoxia, or accessory mm use. Pt speaks in full sentences.   Gastrointestinal: Abd soft, NT, ND. No guarding, rebound, or rigidity.   Musculoskeletal: Range of motion is not limited  Neuro: Alert and oriented x 3, face symmetric and speech fluent. Nml gross motor movement, grossly non focal   Skin: Skin normal color for race, warm, dry and intact. No evidence of rash.  Psych: Alert and oriented to person, place, time/situation. normal mood and affect. no apparent risk to self or others.   Pt p/w n/v/d, abd pain. DDx includes but not limited to AGE / viral illness, less likely colitis, diverticulitis, unlikely ovarian pathology. HCG neg. + ttp in LLQ on ADRIA Zavala's exam.   CT a/p, labs performed w/o acute pathology. IVF, supportive care given w/ improvement in sx. Tolerating PO. Continue supportive care. D/c w/ f/u PCP

## 2023-04-03 ENCOUNTER — EMERGENCY (EMERGENCY)
Facility: HOSPITAL | Age: 43
LOS: 1 days | Discharge: HOME | End: 2023-04-03
Attending: STUDENT IN AN ORGANIZED HEALTH CARE EDUCATION/TRAINING PROGRAM | Admitting: STUDENT IN AN ORGANIZED HEALTH CARE EDUCATION/TRAINING PROGRAM
Payer: MEDICAID

## 2023-04-03 VITALS
SYSTOLIC BLOOD PRESSURE: 121 MMHG | OXYGEN SATURATION: 99 % | TEMPERATURE: 99 F | HEART RATE: 67 BPM | DIASTOLIC BLOOD PRESSURE: 67 MMHG | RESPIRATION RATE: 16 BRPM

## 2023-04-03 VITALS
TEMPERATURE: 99 F | RESPIRATION RATE: 17 BRPM | HEIGHT: 66 IN | HEART RATE: 89 BPM | DIASTOLIC BLOOD PRESSURE: 79 MMHG | WEIGHT: 203.05 LBS | SYSTOLIC BLOOD PRESSURE: 113 MMHG | OXYGEN SATURATION: 100 %

## 2023-04-03 DIAGNOSIS — K21.9 GASTRO-ESOPHAGEAL REFLUX DISEASE WITHOUT ESOPHAGITIS: ICD-10-CM

## 2023-04-03 DIAGNOSIS — Z87.59 PERSONAL HISTORY OF OTHER COMPLICATIONS OF PREGNANCY, CHILDBIRTH AND THE PUERPERIUM: ICD-10-CM

## 2023-04-03 DIAGNOSIS — Z86.69 PERSONAL HISTORY OF OTHER DISEASES OF THE NERVOUS SYSTEM AND SENSE ORGANS: ICD-10-CM

## 2023-04-03 DIAGNOSIS — Z87.09 PERSONAL HISTORY OF OTHER DISEASES OF THE RESPIRATORY SYSTEM: ICD-10-CM

## 2023-04-03 DIAGNOSIS — N83.6 HEMATOSALPINX: ICD-10-CM

## 2023-04-03 DIAGNOSIS — N73.9 FEMALE PELVIC INFLAMMATORY DISEASE, UNSPECIFIED: ICD-10-CM

## 2023-04-03 DIAGNOSIS — Z88.0 ALLERGY STATUS TO PENICILLIN: ICD-10-CM

## 2023-04-03 DIAGNOSIS — N92.0 EXCESSIVE AND FREQUENT MENSTRUATION WITH REGULAR CYCLE: ICD-10-CM

## 2023-04-03 DIAGNOSIS — Z20.822 CONTACT WITH AND (SUSPECTED) EXPOSURE TO COVID-19: ICD-10-CM

## 2023-04-03 DIAGNOSIS — R10.9 UNSPECIFIED ABDOMINAL PAIN: ICD-10-CM

## 2023-04-03 DIAGNOSIS — E78.5 HYPERLIPIDEMIA, UNSPECIFIED: ICD-10-CM

## 2023-04-03 DIAGNOSIS — E03.9 HYPOTHYROIDISM, UNSPECIFIED: ICD-10-CM

## 2023-04-03 LAB
ALBUMIN SERPL ELPH-MCNC: 3.8 G/DL — SIGNIFICANT CHANGE UP (ref 3.3–5)
ALP SERPL-CCNC: 62 U/L — SIGNIFICANT CHANGE UP (ref 40–120)
ALT FLD-CCNC: 10 U/L — SIGNIFICANT CHANGE UP (ref 10–45)
ANION GAP SERPL CALC-SCNC: 11 MMOL/L — SIGNIFICANT CHANGE UP (ref 5–17)
APPEARANCE UR: ABNORMAL
AST SERPL-CCNC: 15 U/L — SIGNIFICANT CHANGE UP (ref 10–40)
BACTERIA # UR AUTO: SIGNIFICANT CHANGE UP /HPF
BASOPHILS # BLD AUTO: 0.05 K/UL — SIGNIFICANT CHANGE UP (ref 0–0.2)
BASOPHILS NFR BLD AUTO: 0.6 % — SIGNIFICANT CHANGE UP (ref 0–2)
BILIRUB SERPL-MCNC: 0.4 MG/DL — SIGNIFICANT CHANGE UP (ref 0.2–1.2)
BILIRUB UR-MCNC: NEGATIVE — SIGNIFICANT CHANGE UP
BUN SERPL-MCNC: 7 MG/DL — SIGNIFICANT CHANGE UP (ref 7–23)
CALCIUM SERPL-MCNC: 9.2 MG/DL — SIGNIFICANT CHANGE UP (ref 8.4–10.5)
CHLORIDE SERPL-SCNC: 105 MMOL/L — SIGNIFICANT CHANGE UP (ref 96–108)
CO2 SERPL-SCNC: 25 MMOL/L — SIGNIFICANT CHANGE UP (ref 22–31)
COLOR SPEC: YELLOW — SIGNIFICANT CHANGE UP
COMMENT - URINE: SIGNIFICANT CHANGE UP
CREAT SERPL-MCNC: 0.7 MG/DL — SIGNIFICANT CHANGE UP (ref 0.5–1.3)
DIFF PNL FLD: ABNORMAL
EGFR: 109 ML/MIN/1.73M2 — SIGNIFICANT CHANGE UP
EOSINOPHIL # BLD AUTO: 0.21 K/UL — SIGNIFICANT CHANGE UP (ref 0–0.5)
EOSINOPHIL NFR BLD AUTO: 2.5 % — SIGNIFICANT CHANGE UP (ref 0–6)
EPI CELLS # UR: SIGNIFICANT CHANGE UP /HPF (ref 0–5)
GLUCOSE SERPL-MCNC: 112 MG/DL — HIGH (ref 70–99)
GLUCOSE UR QL: NEGATIVE — SIGNIFICANT CHANGE UP
HCG SERPL-ACNC: <0 MIU/ML — SIGNIFICANT CHANGE UP
HCT VFR BLD CALC: 43 % — SIGNIFICANT CHANGE UP (ref 34.5–45)
HGB BLD-MCNC: 13.9 G/DL — SIGNIFICANT CHANGE UP (ref 11.5–15.5)
IMM GRANULOCYTES NFR BLD AUTO: 0.2 % — SIGNIFICANT CHANGE UP (ref 0–0.9)
KETONES UR-MCNC: NEGATIVE — SIGNIFICANT CHANGE UP
LEUKOCYTE ESTERASE UR-ACNC: NEGATIVE — SIGNIFICANT CHANGE UP
LIDOCAIN IGE QN: 23 U/L — SIGNIFICANT CHANGE UP (ref 7–60)
LYMPHOCYTES # BLD AUTO: 2.48 K/UL — SIGNIFICANT CHANGE UP (ref 1–3.3)
LYMPHOCYTES # BLD AUTO: 29.5 % — SIGNIFICANT CHANGE UP (ref 13–44)
MCHC RBC-ENTMCNC: 26.5 PG — LOW (ref 27–34)
MCHC RBC-ENTMCNC: 32.3 GM/DL — SIGNIFICANT CHANGE UP (ref 32–36)
MCV RBC AUTO: 82.1 FL — SIGNIFICANT CHANGE UP (ref 80–100)
MONOCYTES # BLD AUTO: 0.47 K/UL — SIGNIFICANT CHANGE UP (ref 0–0.9)
MONOCYTES NFR BLD AUTO: 5.6 % — SIGNIFICANT CHANGE UP (ref 2–14)
NEUTROPHILS # BLD AUTO: 5.17 K/UL — SIGNIFICANT CHANGE UP (ref 1.8–7.4)
NEUTROPHILS NFR BLD AUTO: 61.6 % — SIGNIFICANT CHANGE UP (ref 43–77)
NITRITE UR-MCNC: NEGATIVE — SIGNIFICANT CHANGE UP
NRBC # BLD: 0 /100 WBCS — SIGNIFICANT CHANGE UP (ref 0–0)
PH UR: 6 — SIGNIFICANT CHANGE UP (ref 5–8)
PLATELET # BLD AUTO: 257 K/UL — SIGNIFICANT CHANGE UP (ref 150–400)
POTASSIUM SERPL-MCNC: 3.8 MMOL/L — SIGNIFICANT CHANGE UP (ref 3.5–5.3)
POTASSIUM SERPL-SCNC: 3.8 MMOL/L — SIGNIFICANT CHANGE UP (ref 3.5–5.3)
PROT SERPL-MCNC: 7.1 G/DL — SIGNIFICANT CHANGE UP (ref 6–8.3)
PROT UR-MCNC: NEGATIVE MG/DL — SIGNIFICANT CHANGE UP
RBC # BLD: 5.24 M/UL — HIGH (ref 3.8–5.2)
RBC # FLD: 13.7 % — SIGNIFICANT CHANGE UP (ref 10.3–14.5)
RBC CASTS # UR COMP ASSIST: ABNORMAL /HPF
SARS-COV-2 RNA SPEC QL NAA+PROBE: SIGNIFICANT CHANGE UP
SODIUM SERPL-SCNC: 141 MMOL/L — SIGNIFICANT CHANGE UP (ref 135–145)
SP GR SPEC: >=1.03 — SIGNIFICANT CHANGE UP (ref 1–1.03)
UROBILINOGEN FLD QL: 1 E.U./DL — SIGNIFICANT CHANGE UP
WBC # BLD: 8.4 K/UL — SIGNIFICANT CHANGE UP (ref 3.8–10.5)
WBC # FLD AUTO: 8.4 K/UL — SIGNIFICANT CHANGE UP (ref 3.8–10.5)
WBC UR QL: < 5 /HPF — SIGNIFICANT CHANGE UP

## 2023-04-03 PROCEDURE — 80053 COMPREHEN METABOLIC PANEL: CPT

## 2023-04-03 PROCEDURE — 76856 US EXAM PELVIC COMPLETE: CPT | Mod: 26

## 2023-04-03 PROCEDURE — 76830 TRANSVAGINAL US NON-OB: CPT | Mod: 26

## 2023-04-03 PROCEDURE — 83690 ASSAY OF LIPASE: CPT

## 2023-04-03 PROCEDURE — 99284 EMERGENCY DEPT VISIT MOD MDM: CPT

## 2023-04-03 PROCEDURE — 87635 SARS-COV-2 COVID-19 AMP PRB: CPT

## 2023-04-03 PROCEDURE — 85025 COMPLETE CBC W/AUTO DIFF WBC: CPT

## 2023-04-03 PROCEDURE — 87591 N.GONORRHOEAE DNA AMP PROB: CPT

## 2023-04-03 PROCEDURE — 96361 HYDRATE IV INFUSION ADD-ON: CPT

## 2023-04-03 PROCEDURE — 76856 US EXAM PELVIC COMPLETE: CPT

## 2023-04-03 PROCEDURE — 36415 COLL VENOUS BLD VENIPUNCTURE: CPT

## 2023-04-03 PROCEDURE — 81001 URINALYSIS AUTO W/SCOPE: CPT

## 2023-04-03 PROCEDURE — 96360 HYDRATION IV INFUSION INIT: CPT

## 2023-04-03 PROCEDURE — 87491 CHLMYD TRACH DNA AMP PROBE: CPT

## 2023-04-03 PROCEDURE — 99284 EMERGENCY DEPT VISIT MOD MDM: CPT | Mod: 25

## 2023-04-03 PROCEDURE — 76830 TRANSVAGINAL US NON-OB: CPT

## 2023-04-03 PROCEDURE — 84702 CHORIONIC GONADOTROPIN TEST: CPT

## 2023-04-03 RX ORDER — LEVOFLOXACIN 5 MG/ML
1 INJECTION, SOLUTION INTRAVENOUS
Qty: 28 | Refills: 0
Start: 2023-04-03 | End: 2023-04-16

## 2023-04-03 RX ORDER — METRONIDAZOLE 500 MG
1 TABLET ORAL
Qty: 294 | Refills: 0
Start: 2023-04-03 | End: 2023-08-27

## 2023-04-03 RX ORDER — METRONIDAZOLE 500 MG
500 TABLET ORAL ONCE
Refills: 0 | Status: COMPLETED | OUTPATIENT
Start: 2023-04-03 | End: 2023-04-03

## 2023-04-03 RX ORDER — LEVOFLOXACIN 5 MG/ML
500 INJECTION, SOLUTION INTRAVENOUS ONCE
Refills: 0 | Status: COMPLETED | OUTPATIENT
Start: 2023-04-03 | End: 2023-04-03

## 2023-04-03 RX ORDER — SODIUM CHLORIDE 9 MG/ML
1000 INJECTION INTRAMUSCULAR; INTRAVENOUS; SUBCUTANEOUS ONCE
Refills: 0 | Status: COMPLETED | OUTPATIENT
Start: 2023-04-03 | End: 2023-04-03

## 2023-04-03 RX ORDER — IBUPROFEN 200 MG
1 TABLET ORAL
Qty: 20 | Refills: 0
Start: 2023-04-03

## 2023-04-03 RX ORDER — KETOROLAC TROMETHAMINE 30 MG/ML
15 SYRINGE (ML) INJECTION ONCE
Refills: 0 | Status: DISCONTINUED | OUTPATIENT
Start: 2023-04-03 | End: 2023-04-03

## 2023-04-03 RX ORDER — TRAMADOL HYDROCHLORIDE 50 MG/1
1 TABLET ORAL
Qty: 6 | Refills: 0
Start: 2023-04-03

## 2023-04-03 RX ADMIN — Medication 500 MILLIGRAM(S): at 21:28

## 2023-04-03 RX ADMIN — SODIUM CHLORIDE 1000 MILLILITER(S): 9 INJECTION INTRAMUSCULAR; INTRAVENOUS; SUBCUTANEOUS at 21:28

## 2023-04-03 RX ADMIN — SODIUM CHLORIDE 1000 MILLILITER(S): 9 INJECTION INTRAMUSCULAR; INTRAVENOUS; SUBCUTANEOUS at 15:13

## 2023-04-03 RX ADMIN — LEVOFLOXACIN 500 MILLIGRAM(S): 5 INJECTION, SOLUTION INTRAVENOUS at 21:28

## 2023-04-03 RX ADMIN — Medication 100 MILLIGRAM(S): at 21:28

## 2023-04-03 NOTE — ED PROVIDER NOTE - CARE PROVIDERS DIRECT ADDRESSES
[email or web address],DirectAddress_Unknown

## 2023-04-03 NOTE — ED PROVIDER NOTE - PROGRESS NOTE DETAILS
labs wnl, UA no infection.   sonogram w/ 1. Heterogeneous myometrium with some vertical areas of shadowing. This may represent adenomyosis. 2. Findings are concerning for a right hydro/hematosalpinx.  normal blood flow.  STI testing sent.  likely PID causing hydro/hematosalpinx.  d/w gyn (no official c/s), agree tx for PID and outpt f/u.  PCN allergy so given levaquin as per UTD.  levaquin (educated on tendon rupt risk&no exercise)/doxy/flagyl and outpt GYN f/u.  discussed strict return parameters

## 2023-04-03 NOTE — ED PROVIDER NOTE - CARE PROVIDER_API CALL
Jose Bravo)  Obstetrics and Gynecology  [address] [address]  Phone: [phone]  Fax: [phone]  Follow Up Time:     Marybel Kate)  Obstetrics and Gynecology  [address], [place] [address]  Phone: [phone]  Fax: [phone]  Follow Up Time:     Ariadna Mendoza)  Obstetrics and Gynecology  [address], [place] [address]  Phone: [phone]  Fax: [phone]  Follow Up Time:

## 2023-04-03 NOTE — ED PROVIDER NOTE - CCCP TRG CHIEF CMPLNT
[place] [address] Internal Medicine    Discharge Summary     Patient ID: Ricky Zuñiga  :  1947   MRN: [id number]     ACCOUNT:  [de-identified]   Patient's PCP: Darvin Galdamez MD  Admit Date: 2022   Discharge Date: 2022    Length of Stay: 1  Code Status:  Full Code  Admitting Physician: Robb Galan MD  Discharge Physician: Robb Galan [facility] [address] , abdominal pain

## 2023-04-03 NOTE — ED PROVIDER NOTE - CLINICAL SUMMARY MEDICAL DECISION MAKING FREE TEXT BOX
42 F pmh ectopic preg, hypothytoid, hld p/w R abd pain, nausea x5 days and vaginal bleeding today.  on exam vss, afebrile, Abd: nondistended, soft, + R lower/R pelvic ttp, no r/g, no cvat;  Pelvic: normal external genitalia, + BRB in vault w/o active bleeding, os closed, no CMT, + R adnexal ttp, no mass palpable.  r/o preg/ectopic, ovarian cyst rupture, pain not c/w torsion.  consider appendicitis.  will obtain labs, sonogram, give analgesia.  consider CT if sonogram normal 42 F pmh ectopic preg, hypothytoid, hld p/w R abd pain, nausea x5 days and vaginal bleeding today.  on exam vss, afebrile, Abd: nondistended, soft, + R lower/R pelvic ttp, no r/g, no cvat;  Pelvic: normal external genitalia, + BRB in vault w/o active bleeding, os closed, no CMT, + R adnexal ttp, no mass palpable.  r/o preg/ectopic, ovarian cyst rupture, PID, pain not c/w torsion.  consider appendicitis.  will obtain labs, sonogram, give analgesia.  consider CT if sonogram normal

## 2023-04-03 NOTE — ED ADULT NURSE REASSESSMENT NOTE - NS ED NURSE REASSESS COMMENT FT1
pt agitated being aggressive using profanity toward writer requesting Md. ADRIA Canales made aware. pt continues to decline any pain medication
per primary RN pt declined Toradol at present, pending US

## 2023-04-03 NOTE — ED PROVIDER NOTE - ATTENDING APP SHARED VISIT CONTRIBUTION OF CARE
42 F pmh ectopic preg, hypothytoid, hld p/w R abd pain, nausea and vaginal bleeding x 5 days. Denies fever/chills/dysuria/rash/diarrhea. On exam, VS wnl, mild R suprapubic ttp with no rebound/guarding, no ruq/epigastric/luq/llq ttp no cvat bilat. skin warm and dry, awake alert nad rrr nl s1 s2, no s3, s4, no murmurs/rubs/gallops, no ble edema, lungs ctab, a/w patent. per PA exam pt had +R adnexal ttp. ddx: pregnancy vs ectopic vs torsion vs ovarian cyst vs uti. Plan: hcg neg, UA shows no e/o uti, labs otherwise wnl. pt pending tvus. will reassess 42 F pmh ectopic preg, hypothytoid, hld p/w R abd pain, nausea and vaginal bleeding x 5 days. Denies fever/chills/dysuria/rash/diarrhea. On exam, VS wnl, mild R suprapubic ttp with no rebound/guarding, no ruq/epigastric/luq/llq ttp no cvat bilat. skin warm and dry, awake alert nad rrr nl s1 s2, no s3, s4, no murmurs/rubs/gallops, no ble edema, lungs ctab, a/w patent. per PA exam pt had +R adnexal ttp. ddx: pregnancy vs ectopic vs torsion vs ovarian cyst vs uti. Plan: hcg neg, UA shows no e/o uti, labs otherwise wnl. pt pending tvus. will reassess    Progress: hCG negative, transvaginal ultrasound shows hydro/hemosalpinx.  The PA discussed this with the OB/GYN team, who recommended the patient be treated for PID.  Patient has anaphylaxis allergy to penicillin.  We will treat with doxycycline, Flagyl, and Levaquin and discharged with OB/GYN follow-up.

## 2023-04-03 NOTE — ED ADULT NURSE NOTE - CAS EDN DISCHARGE INTERVENTIONS
IV discontinued, cath removed intact Breath Sounds equal & clear to percussion & auscultation, no accessory muscle use

## 2023-04-03 NOTE — ED ADULT NURSE NOTE - OBJECTIVE STATEMENT
41 y/o female c/o RLQ pain, radiating to the back x 5 days. reports having vaginal bleeding today. LMP 3/9/23.

## 2023-04-03 NOTE — ED PROVIDER NOTE - OBJECTIVE STATEMENT
42 F pmh ectopic preg, hypothytoid, hld p/w R abd pain, nausea and vaginal bleeding x 5 days.  pt reports thursday she noted sharp pain in R pelvic region, radiates to back and had episodes of vaginal spotting.  pain persisted and worsened today.  noted bright red vaginal bleeding that returned today, approx 2 pads today.  also w/ nausea, no vomiting.  LMP march 9th.  sexually active.  h/o PID requiring surgical intervention 20yrs ago and L ectopic requiring surgery.  similar pain/bleeding w/ prior ectopic.  denies f/c, headache, dizziness, fainting, chest pain, sob, uri sxs, diarrhea, constipation, BRBPR/melena, dysuria/hematuria, trauma 42 F pmh ectopic preg, hypothytoid, hld p/w R abd pain, nausea and vaginal bleeding x 5 days.  pt reports thursday she noted sharp pain in R pelvic region, radiates to back and had episodes of vaginal spotting.  pain persisted and worsened today.  noted bright red vaginal bleeding that returned today w/ malodorous smell, approx 2 pads today.  also w/ nausea, no vomiting.  LMP march 9th.  sexually active.  h/o PID/TOA requiring surgical intervention 20yrs ago and L ectopic requiring surgery.  similar pain/bleeding w/ prior ectopic.  denies f/c, headache, dizziness, fainting, chest pain, sob, uri sxs, diarrhea, constipation, BRBPR/melena, dysuria/hematuria, trauma

## 2023-04-03 NOTE — ED PROVIDER NOTE - PATIENT PORTAL LINK FT
You can access the FollowMyHealth Patient Portal offered by [facility] by registering at the following website: [email or web address]. By joining [employer]’s FollowMyHealth portal, you will also be able to view your health information using other applications (apps) compatible with our system.

## 2023-04-03 NOTE — ED PROVIDER NOTE - IV ALTEPLASE DOOR HIDDEN
CM attempted to call pt to discuss discharge plan regarding upcoming surgery. Voicemail left for pt to return call.      Electronically signed by Dustin Acuña RN on 11/5/2020 at 3:22 PM
[facility] liaison. No other needs identified from CM at this time.
show

## 2023-04-03 NOTE — ED PROVIDER NOTE - PHYSICAL EXAMINATION
Vitals reviewed  Gen: comfortable appearing, nad, speaking in full sentences  Skin: wwp, no rash/lesions  HEENT: ncat, eomi, mmm  CV: rrr, no audible m/r/g  Resp: symmetrical expansion, ctab, no w/r/r  Abd: nondistended, soft, + R lower/R pelvic ttp, no r/g, no cvat  Pelvic: normal external genitalia, + BRB in vault w/o active bleeding, os closed, no CMT, + R adnexal ttp, no mass palpable   Ext: FROM throughout, no peripheral edema  Neuro: alert/oriented, no focal deficits, steady gait

## 2023-04-03 NOTE — ED PROVIDER NOTE - NSFOLLOWUPINSTRUCTIONS_ED_ALL_ED_FT
Please rest and remain well hydrated with plenty of fluids.  You can take motrin 600-800mg and tylenol 650mg every 3 hours, switching between the two for pain/bodyaches or fevers (>100.4F/>38C)    Please take full course of ALL antibiotics as prescribed for pelvic inflammatory disease    Call to arrange follow up with gynecologist within one week  You may call our referrals coordinator at [phone] Monday to Friday 11am-7pm for assistance with making an appointment      Pelvic Inflammatory Disease  A female body showing the reproductive organs, with a close-up view of the fallopian tubes, ovaries, and uterus.  Pelvic inflammatory disease (PID) is an infection in some or all of the female reproductive organs. The infection can be in the uterus, ovaries, fallopian tubes, or the surrounding tissues in the pelvis. PID can cause abdominal or pelvic pain that comes on suddenly (acute pelvic pain).    PID is a serious infection because it can lead to lasting (chronic) pelvic pain or the inability to have children (infertility).    What are the causes?  This condition is most often caused by bacteria that is spread during sexual contact. It can also be caused by a bacterial infection of the vagina (bacterial vaginosis) that is not spread by sexual contact.    This condition occurs when the infection is not treated and the bacteria travel upward from the vagina or cervix into the reproductive organs. Bacteria may also be introduced into the reproductive organs following:  The birth of a baby.  A miscarriage.  An .  Pelvic procedures or surgery.  The insertion of an intrauterine device (IUD).  A sexual assault.  What increases the risk?  You are more likely to develop this condition if you:  Are younger than 25 years of age.  Are sexually active at a young age.  Have a history of STI (sexually transmitted infection) or PID.  Have unprotected sex or do not use contraceptive barrier methods, such as condoms.  Have multiple sexual partners.  Have sex with someone who has symptoms of an STI.  Use a douche.  What are the signs or symptoms?  Symptoms of this condition include:  Abdominal or pelvic pain.  Fever or chills.  Abnormal vaginal discharge.  Abnormal uterine bleeding.  Unusual pain shortly after the end of a menstrual period.  Pain with urination or sex.  Feeling nauseous or vomiting.  How is this diagnosed?  This condition may be diagnosed based on:  Your medical history.  A pelvic exam. This exam can reveal signs of infection, inflammation, and discharge in the vagina and the surrounding tissues. It can also help to identify painful areas.  You may also have tests, including:  A pregnancy test.  Blood tests.  A urine test.  Culture tests of the vagina and cervix to check for an STI.  Ultrasound.  A laparoscopic procedure to look inside the pelvis.  Biopsies of the lining of the uterus (endometrial biopsy).  How is this treated?  This condition may be treated with:  Antibiotic medicines taken by mouth (orally). For more severe cases, antibiotics may be given through an IV at the hospital.  Efforts to stop the spread of the infection. Sexual partners may need to be treated if the infection is caused by an STI.  Surgery. This is rare. Surgery may be needed if other treatments do not help.  It may take weeks until you are completely well. Your health care provider may test you for infection again 3 months after treatment.    If you are diagnosed with PID, you should also be checked for HIV (human immunodeficiency virus).    Follow these instructions at home:  Take over-the-counter and prescription medicines only as told by your health care provider.  If you were prescribed an antibiotic medicine, take it as told by your health care provider. Do not stop using the antibiotic even if you start to feel better.  Do not have sex until treatment is completed or as told by your health care provider. If PID is confirmed, your recent sexual partners will need treatment, especially if you had unprotected sex.  Keep all follow-up visits. This is important.  Contact a health care provider if:  You have increased or abnormal vaginal discharge.  Your pain does not improve.  You have a fever or chills.  You cannot tolerate your medicines.  Your partner has an STI.  You have pain when you urinate.  Get help right away if:  You have increased abdominal or pelvic pain.  Your symptoms are getting worse.  Your symptoms are not better in 72 hours with treatment.  Summary  Pelvic inflammatory disease (PID) is caused by an infection in some or all of the female reproductive organs.  PID is a serious infection because it can lead to lasting (chronic) pelvic pain or the inability to have children (infertility).  This infection is usually treated with antibiotic medicines.  Do not have sex until treatment is completed or as told by your health care provider.  This information is not intended to replace advice given to you by your health care provider. Make sure you discuss any questions you have with your health care provider.

## 2023-04-03 NOTE — ED ADULT TRIAGE NOTE - CHIEF COMPLAINT QUOTE
pt BIBA c/o RLQ pain, radiating to the back x 5 days. reports having vaginal bleeding today. IV placed by EMS 20 G right hand.

## 2023-04-04 LAB
C TRACH RRNA SPEC QL NAA+PROBE: SIGNIFICANT CHANGE UP
N GONORRHOEA RRNA SPEC QL NAA+PROBE: SIGNIFICANT CHANGE UP
SPECIMEN SOURCE: SIGNIFICANT CHANGE UP

## 2023-04-06 NOTE — ED PROVIDER NOTE - OBJECTIVE STATEMENT
Monitor: The problem is stable.  Evaluation: No labs/tests required today.  Assessment/Treatment:  Continue current treatment/monitoring regimen.    39 yo F with no pmh c/o back pain x 5 days. Pt states the pain started in the L lower back. Pt states it feels like spasms that radiated to her L groin. Pt states she urine was dark and she thought she saw some specs of blood so she took an old abx that she had. Pt states that pain improved. Pt then got a new bed because she thought that might have been contributing to the pain. Pt then woke up 2 days ago with pain on the R lower back radiating up to the R side of her neck, around into her  chest. PAin worse with movements. Denies trauma. Denies palpitations, sweats, sob, n/v, dizziness, cough, fever. Denies smoking, recent travel, OCP use. Pt does reports some chills. Also c/o vaginal pressure and dysuria x 2 days. Denies urinary frequency, vaginal bleeding, discharge. Pt is sexually active with 1 partner.

## 2023-04-16 ENCOUNTER — EMERGENCY (EMERGENCY)
Facility: HOSPITAL | Age: 43
LOS: 1 days | Discharge: HOME | End: 2023-04-16
Attending: EMERGENCY MEDICINE | Admitting: EMERGENCY MEDICINE
Payer: MEDICAID

## 2023-04-16 VITALS
TEMPERATURE: 98 F | SYSTOLIC BLOOD PRESSURE: 119 MMHG | RESPIRATION RATE: 18 BRPM | WEIGHT: 203.05 LBS | HEIGHT: 66 IN | HEART RATE: 89 BPM | OXYGEN SATURATION: 97 % | DIASTOLIC BLOOD PRESSURE: 87 MMHG

## 2023-04-16 DIAGNOSIS — K64.4 RESIDUAL HEMORRHOIDAL SKIN TAGS: ICD-10-CM

## 2023-04-16 DIAGNOSIS — R35.0 FREQUENCY OF MICTURITION: ICD-10-CM

## 2023-04-16 DIAGNOSIS — Z90.49 ACQUIRED ABSENCE OF OTHER SPECIFIED PARTS OF DIGESTIVE TRACT: ICD-10-CM

## 2023-04-16 DIAGNOSIS — Z20.822 CONTACT WITH AND (SUSPECTED) EXPOSURE TO COVID-19: ICD-10-CM

## 2023-04-16 DIAGNOSIS — Z88.0 ALLERGY STATUS TO PENICILLIN: ICD-10-CM

## 2023-04-16 DIAGNOSIS — Z87.59 PERSONAL HISTORY OF OTHER COMPLICATIONS OF PREGNANCY, CHILDBIRTH AND THE PUERPERIUM: ICD-10-CM

## 2023-04-16 DIAGNOSIS — Z90.721 ACQUIRED ABSENCE OF OVARIES, UNILATERAL: ICD-10-CM

## 2023-04-16 DIAGNOSIS — K62.5 HEMORRHAGE OF ANUS AND RECTUM: ICD-10-CM

## 2023-04-16 DIAGNOSIS — K59.00 CONSTIPATION, UNSPECIFIED: ICD-10-CM

## 2023-04-16 DIAGNOSIS — E78.5 HYPERLIPIDEMIA, UNSPECIFIED: ICD-10-CM

## 2023-04-16 DIAGNOSIS — R10.31 RIGHT LOWER QUADRANT PAIN: ICD-10-CM

## 2023-04-16 DIAGNOSIS — E03.9 HYPOTHYROIDISM, UNSPECIFIED: ICD-10-CM

## 2023-04-16 DIAGNOSIS — R10.2 PELVIC AND PERINEAL PAIN: ICD-10-CM

## 2023-04-16 DIAGNOSIS — Z87.42 PERSONAL HISTORY OF OTHER DISEASES OF THE FEMALE GENITAL TRACT: ICD-10-CM

## 2023-04-16 LAB
ALBUMIN SERPL ELPH-MCNC: 3.5 G/DL — SIGNIFICANT CHANGE UP (ref 3.3–5)
ALP SERPL-CCNC: 63 U/L — SIGNIFICANT CHANGE UP (ref 40–120)
ALT FLD-CCNC: 10 U/L — SIGNIFICANT CHANGE UP (ref 10–45)
ANION GAP SERPL CALC-SCNC: 10 MMOL/L — SIGNIFICANT CHANGE UP (ref 5–17)
APTT BLD: 32.9 SEC — SIGNIFICANT CHANGE UP (ref 27.5–35.5)
AST SERPL-CCNC: 14 U/L — SIGNIFICANT CHANGE UP (ref 10–40)
BASOPHILS # BLD AUTO: 0.05 K/UL — SIGNIFICANT CHANGE UP (ref 0–0.2)
BASOPHILS NFR BLD AUTO: 0.5 % — SIGNIFICANT CHANGE UP (ref 0–2)
BILIRUB SERPL-MCNC: 0.2 MG/DL — SIGNIFICANT CHANGE UP (ref 0.2–1.2)
BLD GP AB SCN SERPL QL: NEGATIVE — SIGNIFICANT CHANGE UP
BUN SERPL-MCNC: 9 MG/DL — SIGNIFICANT CHANGE UP (ref 7–23)
CALCIUM SERPL-MCNC: 8.9 MG/DL — SIGNIFICANT CHANGE UP (ref 8.4–10.5)
CHLORIDE SERPL-SCNC: 102 MMOL/L — SIGNIFICANT CHANGE UP (ref 96–108)
CO2 SERPL-SCNC: 24 MMOL/L — SIGNIFICANT CHANGE UP (ref 22–31)
CREAT SERPL-MCNC: 0.81 MG/DL — SIGNIFICANT CHANGE UP (ref 0.5–1.3)
EGFR: 92 ML/MIN/1.73M2 — SIGNIFICANT CHANGE UP
EOSINOPHIL # BLD AUTO: 0.21 K/UL — SIGNIFICANT CHANGE UP (ref 0–0.5)
EOSINOPHIL NFR BLD AUTO: 2.1 % — SIGNIFICANT CHANGE UP (ref 0–6)
GLUCOSE SERPL-MCNC: 100 MG/DL — HIGH (ref 70–99)
HCT VFR BLD CALC: 38.6 % — SIGNIFICANT CHANGE UP (ref 34.5–45)
HCT VFR BLD CALC: 40.4 % — SIGNIFICANT CHANGE UP (ref 34.5–45)
HGB BLD-MCNC: 12.4 G/DL — SIGNIFICANT CHANGE UP (ref 11.5–15.5)
HGB BLD-MCNC: 13 G/DL — SIGNIFICANT CHANGE UP (ref 11.5–15.5)
IMM GRANULOCYTES NFR BLD AUTO: 0.5 % — SIGNIFICANT CHANGE UP (ref 0–0.9)
INR BLD: 1.1 — SIGNIFICANT CHANGE UP (ref 0.88–1.16)
LYMPHOCYTES # BLD AUTO: 2.63 K/UL — SIGNIFICANT CHANGE UP (ref 1–3.3)
LYMPHOCYTES # BLD AUTO: 26.3 % — SIGNIFICANT CHANGE UP (ref 13–44)
MCHC RBC-ENTMCNC: 25.9 PG — LOW (ref 27–34)
MCHC RBC-ENTMCNC: 25.9 PG — LOW (ref 27–34)
MCHC RBC-ENTMCNC: 32.1 GM/DL — SIGNIFICANT CHANGE UP (ref 32–36)
MCHC RBC-ENTMCNC: 32.2 GM/DL — SIGNIFICANT CHANGE UP (ref 32–36)
MCV RBC AUTO: 80.6 FL — SIGNIFICANT CHANGE UP (ref 80–100)
MCV RBC AUTO: 80.8 FL — SIGNIFICANT CHANGE UP (ref 80–100)
MONOCYTES # BLD AUTO: 0.58 K/UL — SIGNIFICANT CHANGE UP (ref 0–0.9)
MONOCYTES NFR BLD AUTO: 5.8 % — SIGNIFICANT CHANGE UP (ref 2–14)
NEUTROPHILS # BLD AUTO: 6.49 K/UL — SIGNIFICANT CHANGE UP (ref 1.8–7.4)
NEUTROPHILS NFR BLD AUTO: 64.8 % — SIGNIFICANT CHANGE UP (ref 43–77)
NRBC # BLD: 0 /100 WBCS — SIGNIFICANT CHANGE UP (ref 0–0)
NRBC # BLD: 0 /100 WBCS — SIGNIFICANT CHANGE UP (ref 0–0)
PLATELET # BLD AUTO: 242 K/UL — SIGNIFICANT CHANGE UP (ref 150–400)
PLATELET # BLD AUTO: 253 K/UL — SIGNIFICANT CHANGE UP (ref 150–400)
POTASSIUM SERPL-MCNC: 3.6 MMOL/L — SIGNIFICANT CHANGE UP (ref 3.5–5.3)
POTASSIUM SERPL-SCNC: 3.6 MMOL/L — SIGNIFICANT CHANGE UP (ref 3.5–5.3)
PROT SERPL-MCNC: 6.5 G/DL — SIGNIFICANT CHANGE UP (ref 6–8.3)
PROTHROM AB SERPL-ACNC: 13.1 SEC — SIGNIFICANT CHANGE UP (ref 10.5–13.4)
RBC # BLD: 4.78 M/UL — SIGNIFICANT CHANGE UP (ref 3.8–5.2)
RBC # BLD: 5.01 M/UL — SIGNIFICANT CHANGE UP (ref 3.8–5.2)
RBC # FLD: 13.2 % — SIGNIFICANT CHANGE UP (ref 10.3–14.5)
RBC # FLD: 13.4 % — SIGNIFICANT CHANGE UP (ref 10.3–14.5)
RH IG SCN BLD-IMP: POSITIVE — SIGNIFICANT CHANGE UP
SARS-COV-2 RNA SPEC QL NAA+PROBE: NEGATIVE — SIGNIFICANT CHANGE UP
SODIUM SERPL-SCNC: 136 MMOL/L — SIGNIFICANT CHANGE UP (ref 135–145)
WBC # BLD: 10.01 K/UL — SIGNIFICANT CHANGE UP (ref 3.8–10.5)
WBC # BLD: 7.99 K/UL — SIGNIFICANT CHANGE UP (ref 3.8–10.5)
WBC # FLD AUTO: 10.01 K/UL — SIGNIFICANT CHANGE UP (ref 3.8–10.5)
WBC # FLD AUTO: 7.99 K/UL — SIGNIFICANT CHANGE UP (ref 3.8–10.5)

## 2023-04-16 PROCEDURE — 83690 ASSAY OF LIPASE: CPT

## 2023-04-16 PROCEDURE — 87635 SARS-COV-2 COVID-19 AMP PRB: CPT

## 2023-04-16 PROCEDURE — 74177 CT ABD & PELVIS W/CONTRAST: CPT | Mod: MG

## 2023-04-16 PROCEDURE — 99285 EMERGENCY DEPT VISIT HI MDM: CPT

## 2023-04-16 PROCEDURE — 96360 HYDRATION IV INFUSION INIT: CPT | Mod: XU

## 2023-04-16 PROCEDURE — 85730 THROMBOPLASTIN TIME PARTIAL: CPT

## 2023-04-16 PROCEDURE — 85027 COMPLETE CBC AUTOMATED: CPT

## 2023-04-16 PROCEDURE — 74177 CT ABD & PELVIS W/CONTRAST: CPT | Mod: 26,MG

## 2023-04-16 PROCEDURE — 86850 RBC ANTIBODY SCREEN: CPT

## 2023-04-16 PROCEDURE — 99284 EMERGENCY DEPT VISIT MOD MDM: CPT | Mod: 25

## 2023-04-16 PROCEDURE — G1004: CPT

## 2023-04-16 PROCEDURE — 84702 CHORIONIC GONADOTROPIN TEST: CPT

## 2023-04-16 PROCEDURE — 86901 BLOOD TYPING SEROLOGIC RH(D): CPT

## 2023-04-16 PROCEDURE — 85025 COMPLETE CBC W/AUTO DIFF WBC: CPT

## 2023-04-16 PROCEDURE — 85610 PROTHROMBIN TIME: CPT

## 2023-04-16 PROCEDURE — 86900 BLOOD TYPING SEROLOGIC ABO: CPT

## 2023-04-16 PROCEDURE — 36415 COLL VENOUS BLD VENIPUNCTURE: CPT

## 2023-04-16 PROCEDURE — 80053 COMPREHEN METABOLIC PANEL: CPT

## 2023-04-16 RX ORDER — MORPHINE SULFATE 50 MG/1
4 CAPSULE, EXTENDED RELEASE ORAL ONCE
Refills: 0 | Status: DISCONTINUED | OUTPATIENT
Start: 2023-04-16 | End: 2023-04-16

## 2023-04-16 RX ORDER — ONDANSETRON 8 MG/1
4 TABLET, FILM COATED ORAL ONCE
Refills: 0 | Status: COMPLETED | OUTPATIENT
Start: 2023-04-16 | End: 2023-04-16

## 2023-04-16 RX ORDER — SODIUM CHLORIDE 9 MG/ML
1000 INJECTION INTRAMUSCULAR; INTRAVENOUS; SUBCUTANEOUS ONCE
Refills: 0 | Status: COMPLETED | OUTPATIENT
Start: 2023-04-16 | End: 2023-04-16

## 2023-04-16 RX ORDER — DIATRIZOATE MEGLUMINE 180 MG/ML
30 INJECTION, SOLUTION INTRAVESICAL ONCE
Refills: 0 | Status: COMPLETED | OUTPATIENT
Start: 2023-04-16 | End: 2023-04-16

## 2023-04-16 RX ADMIN — SODIUM CHLORIDE 1000 MILLILITER(S): 9 INJECTION INTRAMUSCULAR; INTRAVENOUS; SUBCUTANEOUS at 21:45

## 2023-04-16 RX ADMIN — DIATRIZOATE MEGLUMINE 30 MILLILITER(S): 180 INJECTION, SOLUTION INTRAVESICAL at 20:41

## 2023-04-16 RX ADMIN — SODIUM CHLORIDE 1000 MILLILITER(S): 9 INJECTION INTRAMUSCULAR; INTRAVENOUS; SUBCUTANEOUS at 20:42

## 2023-04-16 NOTE — ED PROVIDER NOTE - PHYSICAL EXAMINATION
CONSTITUTIONAL: NAD   SKIN: Normal color and turgor.    HEAD: NC/AT.  EYES: Conjunctiva clear. EOMI. PERRL.    ENT: Airway clear. Normal voice.   RESPIRATORY:  Normal work of breathing. Lungs CTAB.  CARDIOVASCULAR:  RRR, S1S2. No M/R/G.      GI:  Abdomen soft, tender periumbilical, right lower quadrant, right midabdomen. No G/R.    : No CVAT.    MSK: Neck supple.  No LE edema or calf tenderness. No joint swelling or ROM limitation.  NEURO: Alert; CN: grossly intact. Speech clear.  HERNANDEZ. Gait steady. CONSTITUTIONAL: NAD   SKIN: Normal color and turgor.    HEAD: NC/AT.  EYES: Conjunctiva clear. EOMI. PERRL.    ENT: Airway clear. Normal voice.   RESPIRATORY:  Normal work of breathing. Lungs CTAB.  CARDIOVASCULAR:  RRR, S1S2. No M/R/G.      GI:  Abdomen soft, tender periumbilical, right lower quadrant, right midabdomen. No G/R.  MARY (chaperoned by RN): external hemorrhoids, no active bleeding. Pink blood on examining finger.   : No CVAT.    MSK: Neck supple.  No LE edema or calf tenderness. No joint swelling or ROM limitation.  NEURO: Alert; CN: grossly intact. Speech clear.  HERNANDEZ. Gait steady.

## 2023-04-16 NOTE — ED PROVIDER NOTE - CARE PROVIDERS DIRECT ADDRESSES
[email or web address]

## 2023-04-16 NOTE — ED PROVIDER NOTE - NSFOLLOWUPINSTRUCTIONS_ED_ALL_ED_FT
Continue the course of doxycycline as prescribed until you finish the course.  Follow up with your Primary Care/GYN at [facility] in 1-2 days.    Follow up with your GI (gastroenterologist) at [facility] this week. If you cannot see them anymore, follow up with [facility].      Please arrange cardiology follow up as well (chest pain a few months ago).    Return to the Emergency Department if you have any new or worsening symptoms, or if you have any concerns.  =================  Rectal Bleeding    WHAT YOU NEED TO KNOW:    What can cause rectal bleeding?    Constipation    Hemorrhoids (swollen blood vessels in your rectum)    Anal fissures (tears in the tissue inside your anus)    Medical conditions, such as cancer, colitis, or diverticulitis    Growths, such as tumors or polyps    Medical treatments, such as radiation or rectal surgery  What increases my risk for rectal bleeding?    Older age    Certain medicines, such as blood thinners and NSAIDs    Medical conditions, such as inflammatory bowel disease, liver disease, or HIV  What other signs and symptoms may happen with rectal bleeding? You may have pain in your rectum or anus. You may also have abdominal pain or cramping.    How is the cause of rectal bleeding diagnosed?    Rectal exam: Your healthcare provider may gently insert a gloved finger into your anus. He or she will collect a bowel movement sample and send it to a lab for tests.    Blood tests: You may need blood taken to check for anemia (low amount of red blood cells).    CT scan: This test is also called a CAT scan. An x-ray machine uses a computer to take pictures of the organs and blood vessels in your abdomen. The pictures may show problems that could cause bleeding. You may be given a dye before the pictures are taken to help healthcare providers see the pictures better. Tell the healthcare provider if you have ever had an allergic reaction to contrast dye.    Colonoscopy: This is a procedure to look inside your lower bowel. It may show where the bleeding is coming from and what is causing it. A tube with a light on the end will be put into your anus and then moved into your colon. If your healthcare provider finds a growth, he or she may remove it.    Endoscopy: This is a procedure to look inside your upper bowel. It may show where the bleeding is coming from and what is causing it. A tube with a light on the end is inserted into your throat and moved down into your stomach and upper bowel. If your healthcare provider finds a growth, he or she may remove it. He or she may put a shot of medicine in bleeding areas to narrow the blood vessels and stop the bleeding. Heat, laser, or electric currents may also be used to make the blood clot.  How is rectal bleeding treated?    Medicine:  Pain medicine: You may be given a prescription medicine to decrease pain. Do not wait until the pain is severe before you take this medicine.    Vasoconstrictors: This medicine decreases the size of your blood vessels and may help stop the bleeding.    Iron supplement: Iron helps your body make more red blood cells.    Steroids: This medicine decreases inflammation in your rectum. It may be applied as a cream, ointment, or lotion.    IV: You may need an IV if you are dehydrated and need extra liquids.    A blood transfusion replaces blood in your body to help it work properly. A blood transfusion is given through an IV. Blood is tested for safety before it is used.    Surgery: You may need surgery to remove hemorrhoids, tumors, or polyps.  What are the risks of rectal bleeding?    You may have abdominal pain or damage to nearby organs and blood vessels with surgery. Even with treatment, rectal bleeding may continue. Or, it may go away for a time and start again.    Without treatment, you may continue to have pain and cramping. You may develop anemia. You may need a blood transfusion. You may lose a large amount of blood. This can be life-threatening.  How can I manage my symptoms? Ask your healthcare provider how much liquid to drink each day and which liquids are best for you. This will help prevent dehydration and constipation.    When should I contact my healthcare provider?    You have a fever.    Your rectal bleeding stopped for a time, but has started again.    You have nausea.    You have cold, sweaty, pale skin.    You have changes in your bowel movements, such as diarrhea.    You have questions or concerns about your condition or care.  When should I seek immediate care or call 911?    You are breathing faster than usual.    You are dizzy, lightheaded, or feel faint.    You are confused or cannot think clearly.    You urinate less than usual or not at all.    Your rectal bleeding is constant or heavy.    You have severe abdominal pain or cramping.  CARE AGREEMENT:    You have the right to help plan your care. Learn about your health condition and how it may be treated. Discuss treatment options with your healthcare providers to decide what care you want to receive. You always have the right to refuse treatment.

## 2023-04-16 NOTE — ED PROVIDER NOTE - PROGRESS NOTE DETAILS
[boudreaux] radiology resident will send vrad message to compare R hydrosalpinx to prior ct a/p 1/14/23. melina: results discussed with pt, aware of hydrosalpinx from prior ED visits (seen on CT and US).  she had one more episode of rectal bleeding in ED a few hours ago, lighter than previous episode.  no significant Hgb drop on repeat CBC.   not feeling weak or lightheaded. pt requesting DC home.  she says she will complete the course of abx and follow up with both her GYN at [facility] this week; she will follow up with GI (last colonoscopy prior to Covid pandemic at [facility]), if she cannot see her old GI, she will follow up with [facility].  she also was seen for chest pain a few months ago but never followed up with cardiology; she says she will arrange cardiology follow up as well (no chest pain in months).      reviewed return precautions, pt expressed clear understanding with teach-back.   stable for DC.

## 2023-04-16 NOTE — ED PROVIDER NOTE - NS ED ROS FT
CONSTITUTIONAL: No fever, chills, or weakness  NEURO: No headache, no dizziness, no syncope; No focal weakness/tingling/numbness  EYES: No visual changes  ENT: No rhinorrhea or sore throat  PULM: No cough or dyspnea  CV: No chest pain or palpitations  GI: Nausea, no vomiting.  See HPI.    : See HPI  MSK: No neck pain, no joint pain.  No leg swelling.   SKIN: no rash or unusual bruising

## 2023-04-16 NOTE — ED PROVIDER NOTE - CARE PROVIDER_API CALL
Jose Manuel Prather J  GASTROENTEROLOGY  [address] [address]  Phone: [phone]  Fax: [phone]  Follow Up Time:     Madan Walters)  Gastroenterology; Internal Medicine  [address] [place] [address]  Phone: [phone]  Fax: [phone]  Follow Up Time:

## 2023-04-16 NOTE — ED PROVIDER NOTE - CLINICAL SUMMARY MEDICAL DECISION MAKING FREE TEXT BOX
Patient with right lower quadrant pain rectal bleeding today.  Recent pelvic ultrasound showed hydrosalpinx versus hematosalpinx on the right side,  currently on treatment for PID with doxycycline, testing for chlamydia and gonorrhea were negative.  Given her history of colon resection and now with 3 days of constipation and straining to move bowels followed by bright rectal bleeding, will obtain CT scan to evaluate for  colitis/diverticulitis, anastomotic leak.  She only had 1 episode of rectal bleeding and on exam there is just trace pink blood on the examining finger after MARY; will have CT scan with oral and IV contrast, does not need a bleeding scan.

## 2023-04-16 NOTE — ED PROVIDER NOTE - OBJECTIVE STATEMENT
43 yo fem PMHx hypothyroidism, HLD, PSHx colon resection in 1998 (colitis?, no cancer), left oophorectomy for ectopic pregnancy, right sided ectopic preg treated nonsurgically who was seen here apx 2 weeks ago for pelvic pain; found to have adenomyosis and hematosalpinx/hydrosalpinx on right; she was treated for PID with Levaquin, metronidazole and doxycyline (severe penicillin allergy); she saw her physician the following day because she felt the abx were "too strong" (felt heart racing) and she only continued the doxycycline (still taking it, says nearly completed course).  She had negative STI testing at [facility] in February, and her GC/chlamydia test results here were also negative from the last ED visit.  She was having vaginal bleeding at the time, which has resolved.  She started feeling constipated two days ago, straining to move bowels over the weekend.  Today had a very large, painful bowel movement followed by rectal bleeding.  She says she saw a lot of bright red blood in the toilet, prompting her to come to the ED.  She still has pain in the right pelvic/abd RLQ region that radiates to her back.  She reports frequent urination, but no dysuria or hematuria currently.

## 2023-04-16 NOTE — ED ADULT NURSE NOTE - CAS EDN DISCHARGE INTERVENTIONS
46yo female with no significant PMHx presenting with headache. Pt states headache is pulsating, unilateral in the R temporal area with radiation to jaw and back of the neck. Sitting up makes it better. Of note, patient has had these headaches for over 3-4 years and was previously diagnosed by a now-retired neurologist with cluster headaches, however, patient has not followed up since then. Patient states that the headaches usually last 4-5 days and then moves to the other side for another 4-5 days and then dissipates for 2 weeks. Additionally, patient endorses nausea. Patient denies fevers/chills, photophobia, focal weakness, blurry/double vision. 46yo female with no significant PMHx presenting with headache. Pt states headache is pulsating, unilateral in the R temporal area with radiation to jaw and back of the neck. Sitting up makes it better. Of note, patient has had these headaches for over 3-4 years and was previously diagnosed by a now-retired neurologist with cluster headaches, however, patient has not followed up since then. Patient states that the headaches usually last 4-5 days and then moves to the other side for another 4-5 days and then dissipates for 2 weeks. Additionally, patient endorses nausea with the headaches. Patient denies fevers/chills, photophobia, focal weakness, blurry/double vision. 46yo female with no significant PMHx presenting with headache. Pt states headache is pulsating, unilateral in the R temporal area with radiation to jaw and back of the neck. Sitting up makes it better. Of note, patient has had these headaches for over 3-4 years and was previously diagnosed by a now-retired neurologist with cluster headaches, however, patient has not followed up since then. Patient states that the headaches usually last 4-5 days and then moves to the other side for another 4-5 days and then dissipates for 2 weeks. Additionally, patient endorses nausea with the headaches. Patient has taken excedrine x2 today with minimal effect. Patient denies fevers/chills, dizziness, photophobia, numbness/tingling, focal weakness, blurry/double vision, vomiting. IV discontinued, cath removed intact 48yo female with no significant PMHx presenting with headache X 4 days. Pt states headache is pulsating, unilateral in the R temporal area with radiation to jaw and back of the neck. Sitting up makes it better. Of note, patient has had these headaches for over 3-4 years and was previously diagnosed by a now-retired neurologist with cluster headaches, however, patient has not followed up since then. Patient states that the headaches usually last 4-5 days and then moves to the other side for another 4-5 days and then dissipates for 2 weeks. Additionally, patient endorses nausea with the headaches. Patient has taken Excedrin x2 today with minimal effect. Patient denies fevers/chills, dizziness, photophobia, numbness/tingling, focal weakness, blurry/double vision, vomiting.

## 2023-04-16 NOTE — ED PROVIDER NOTE - PATIENT PORTAL LINK FT
You can access the FollowMyHealth Patient Portal offered by [facility] by registering at the following website: [email or web address]. By joining [employer]’s FollowMyHealth portal, you will also be able to view your health information using other applications (apps) compatible with our system.

## 2023-04-16 NOTE — ED ADULT NURSE NOTE - OBJECTIVE STATEMENT
42 year old F patient, A+OX3, ambulatory w steady gait presents with c/o of rectal bleeding during a BM this afternoon.  No distress noted.  C/o of mild abd pain.  No pallor noted.

## 2023-04-16 NOTE — ED ADULT TRIAGE NOTE - CHIEF COMPLAINT QUOTE
pt BIBA c/o RLQ pain radiating to the back, also c/o rectal bleeding since this morning. denies use of blood thinners. stated " part of her colon was removed a few years ago because they taught it was cancer"

## 2023-04-17 VITALS
TEMPERATURE: 98 F | HEART RATE: 74 BPM | DIASTOLIC BLOOD PRESSURE: 86 MMHG | OXYGEN SATURATION: 99 % | RESPIRATION RATE: 18 BRPM | SYSTOLIC BLOOD PRESSURE: 124 MMHG

## 2023-04-19 NOTE — ED ADULT NURSE NOTE - CADM POA CENTRAL LINE
No
Detail Level: Zone
Continue Regimen: triamcinolone acetonide 0.1 % topical cream \\nSig: Apply to affected areas twice a day for 2 weeks, take one week off, then use as needed for flares.

## 2023-04-24 ENCOUNTER — APPOINTMENT (OUTPATIENT)
Dept: OBGYN | Facility: CLINIC | Age: 43
End: 2023-04-24
Payer: MEDICAID

## 2023-04-24 ENCOUNTER — NON-APPOINTMENT (OUTPATIENT)
Age: 43
End: 2023-04-24

## 2023-04-24 VITALS — SYSTOLIC BLOOD PRESSURE: 130 MMHG | HEIGHT: 65.75 IN | DIASTOLIC BLOOD PRESSURE: 82 MMHG

## 2023-04-24 DIAGNOSIS — Z87.42 PERSONAL HISTORY OF OTHER DISEASES OF THE FEMALE GENITAL TRACT: ICD-10-CM

## 2023-04-24 DIAGNOSIS — Z87.09 PERSONAL HISTORY OF OTHER DISEASES OF THE RESPIRATORY SYSTEM: ICD-10-CM

## 2023-04-24 DIAGNOSIS — R10.2 PELVIC AND PERINEAL PAIN: ICD-10-CM

## 2023-04-24 DIAGNOSIS — Z87.19 PERSONAL HISTORY OF OTHER DISEASES OF THE DIGESTIVE SYSTEM: ICD-10-CM

## 2023-04-24 PROCEDURE — 99214 OFFICE O/P EST MOD 30 MIN: CPT

## 2023-04-24 NOTE — COUNSELING
[Lab Results] : lab results [Medication Management] : medication management [FreeTextEntry2] : Discussed smoking cessation prior to surgery

## 2023-04-24 NOTE — PLAN
[FreeTextEntry1] : Ms. Daniels is a 43 y/o  LMP 2023 who is seen here for an initial evaluation after she was found to have R hydrosalpinx in the ER during evaluation for a L ectopic pregnancy (~ ), hoping to establish care and discuss possible surgical interventions for 3 years of dymenorrhea, RLQ pelvic pain, dyspareunia. \par - Reviewed imaging - hydrosalpinx has been stable in size since . Unlikely the true cause of her RLQ worsening pain. Given family history however, RSO may be warranted.\par - Pt is a slightly higher risk surgical candidate. She needs PCP clearance, Cardiology work-up for SOB, and smoking cessation. Also discussed that surgery may be difficult due to adhesions, and may very likely not get rid of her pain. Pt would like to continue to consider her options. In the meantime:\par - Recommend genetic counselor referral for family history significant for mother and maternal grandmother with ovarian cancer and maternal aunt with cancer requiring bone marrow transplant.\par - We will send for MRI pelvis w/ contrast, UA/UCx today\par - We also need to obtain records from her OBGYN. Pt reports Pap smear and STI testing in 2023 which was normal. Benign polyp on colonoscopy in , and normal mammograms each year\par \par Plan is to follow-up in 3 months after she sees PCP and Cardiology.\par  \par [place] PGY3 w/ Dr. Goncalves (Attending)\par \par

## 2023-04-24 NOTE — HISTORY OF PRESENT ILLNESS
[Monogamous (Male Partner)] : is monogamous with a male partner [Y] : Positive pregnancy history [Normal Amount/Duration] :  normal amount and duration [Regular Cycle Intervals] : periods have been regular [Menarche Age: ____] : age at menarche was [unfilled] [Currently Active] : currently active [Men] : men [Condoms] : Condoms [Patient reported mammogram was normal] : Patient reported mammogram was normal [Patient reported PAP Smear was normal] : Patient reported PAP Smear was normal [Patient reported colonoscopy was normal] : Patient reported colonoscopy was normal [Diffuse] : diffuse [Menses] : menses [[place]] : intercourse [Ultrasound] : ultrasound [MRI] : MRI [Yes] : Patient has concerns regarding sex [TextBox_4] : as per HPI [Mammogramdate] : 1/5/23 [PapSmeardate] : 2/20/23 [TextBox_31] : At outside clinic [ColonoscopyDate] : 4/25/2020 [TextBox_43] : Polpyectomy performed [GonorrheaDate] : 04/3/2023 [TextBox_63] : negative in the ED [ChlamydiaDate] : 04/03/20204/3/2023 [TextBox_68] : negative in the ED [LMPDate] : 4/4/23 [MensesFreq] : 30 [MensesLength] : 5 [PGxTotal] : 8 [PGHxAbortions] : 1 [[facility]xLiving] : 5 [PGxEctopic] : 2 [TextBox_7] : R sided, radiating to the back [FreeTextEntry1] : pain with intercourse

## 2023-04-24 NOTE — REASON FOR VISIT
[Initial] : an initial consultation for [Dysmenorrhea] : dysmenorrhea [Pelvic Pain] : pelvic pain [Dyspareunia] : dyspareunia

## 2023-04-24 NOTE — PHYSICAL EXAM
[Chaperone Present] : A chaperone was present in the examining room during all aspects of the physical examination [Appropriately responsive] : appropriately responsive [Labia Majora] : normal [Labia Minora] : normal [Normal] : normal [Anteversion] : anteverted [Soft] : soft [Oriented x3] : oriented x3 [Uterine Adnexae] : non-palpable [FreeTextEntry7] : mild TTP over entire R intraumbilical/RLQ area [FreeTextEntry6] : Bimanual exam performed. TT deep palpation in the area of the R fallopian tube but no large masses palpated. General tenderness in the RLQ without rebound or guarding. No cervical motion tenderness. Anteverted small uterus. Possible small fibroid noted on exam.

## 2023-04-24 NOTE — REVIEW OF SYSTEMS
[Dysuria] : dysuria [Pelvic pain] : pelvic pain [Negative] : Heme/Lymph [Urgency] : no urgency [Frequency] : no frequency [Incontinence] : no incontinence [Urethral Discharge] : no urethral discharge [CVA Pain] : no CVA pain [Genital Rash/Irritation] : no genital rash/irritation

## 2023-04-25 LAB
ALBUMIN SERPL ELPH-MCNC: 4.5 G/DL
ALP BLD-CCNC: 65 U/L
ALT SERPL-CCNC: 11 U/L
ANION GAP SERPL CALC-SCNC: 17 MMOL/L
APPEARANCE: CLEAR
AST SERPL-CCNC: 15 U/L
BASOPHILS # BLD AUTO: 0.04 K/UL
BASOPHILS NFR BLD AUTO: 0.5 %
BILIRUB SERPL-MCNC: 0.3 MG/DL
BILIRUBIN URINE: NEGATIVE
BLOOD URINE: NEGATIVE
BUN SERPL-MCNC: 11 MG/DL
CALCIUM SERPL-MCNC: 10.1 MG/DL
CHLORIDE SERPL-SCNC: 103 MMOL/L
CO2 SERPL-SCNC: 19 MMOL/L
COLOR: YELLOW
CREAT SERPL-MCNC: 0.72 MG/DL
EGFR: 106 ML/MIN/1.73M2
EOSINOPHIL # BLD AUTO: 0.21 K/UL
EOSINOPHIL NFR BLD AUTO: 2.8 %
GLUCOSE QUALITATIVE U: NEGATIVE MG/DL
GLUCOSE SERPL-MCNC: 87 MG/DL
HCT VFR BLD CALC: 45.7 %
HGB BLD-MCNC: 14.2 G/DL
IMM GRANULOCYTES NFR BLD AUTO: 0.1 %
KETONES URINE: NEGATIVE MG/DL
LEUKOCYTE ESTERASE URINE: NEGATIVE
LYMPHOCYTES # BLD AUTO: 2.64 K/UL
LYMPHOCYTES NFR BLD AUTO: 34.9 %
MAN DIFF?: NORMAL
MCHC RBC-ENTMCNC: 25.6 PG
MCHC RBC-ENTMCNC: 31.1 GM/DL
MCV RBC AUTO: 82.3 FL
MONOCYTES # BLD AUTO: 0.53 K/UL
MONOCYTES NFR BLD AUTO: 7 %
NEUTROPHILS # BLD AUTO: 4.14 K/UL
NEUTROPHILS NFR BLD AUTO: 54.7 %
NITRITE URINE: NEGATIVE
PH URINE: 6.5
PLATELET # BLD AUTO: 283 K/UL
POTASSIUM SERPL-SCNC: 4.9 MMOL/L
PROT SERPL-MCNC: 7.7 G/DL
PROTEIN URINE: NEGATIVE MG/DL
RBC # BLD: 5.55 M/UL
RBC # FLD: 14.2 %
SODIUM SERPL-SCNC: 139 MMOL/L
SPECIFIC GRAVITY URINE: 1.01
UROBILINOGEN URINE: 0.2 MG/DL
WBC # FLD AUTO: 7.57 K/UL

## 2023-04-25 NOTE — ED PROVIDER NOTE - CLINICAL SUMMARY MEDICAL DECISION MAKING FREE TEXT BOX
37 yo  f with no pmh with multiple medical complaints. PT reports back spasms into neck and chest. ALso c/o vaginal pressure and dysuria. Denies fever, sob, cough, recent travel. Denies trauma. NO PE risk factors. VSS. EKG NSR @ 65. Obese,well appearing. Lungs cta b/l. +tenderness to R paraspinal muscles, pain worse with movements. Lungs cta b/l. Abd soft, nt, nd. Normal pelvic exam. Will check labs, UA, CXR. Back pain likely MSK. PERC neg. 1979 39 yo  f with no pmh with multiple medical complaints. PT reports back spasms into neck and chest. ALso c/o vaginal pressure and dysuria. Denies fever, sob, cough, recent travel. Denies trauma. NO PE risk factors. VSS. EKG NSR @ 65. Obese,well appearing. Lungs cta b/l. +tenderness to R paraspinal muscles, pain worse with movements. Lungs cta b/l. Abd soft, nt, nd. Normal pelvic exam. Will check labs, UA, CXR. Back pain likely MSK. PERC neg. Labs, cxr, ua unremarkable. Pt feeling better after pain meds. Likely MSK pain

## 2023-04-26 LAB — BACTERIA UR CULT: NORMAL

## 2023-04-27 NOTE — ED ADULT NURSE NOTE - HIV OFFER
Previously Declined (within the last year)
Flu With COVID-19 By LEYLA (04.27.23 @ 11:38)   SARS-CoV-2 Result: NotDetec: This Respiratory Panel uses polymerase chain reaction (PCR) to detect for   influenza A; influenza B; respiratory syncytial virus; and SARS-CoV-2.  Influenza A Result: NotDetec  Influenza B Result: NotDetec  Resp Syn Virus Result: NotDetec

## 2023-05-09 ENCOUNTER — APPOINTMENT (OUTPATIENT)
Dept: MRI IMAGING | Facility: CLINIC | Age: 43
End: 2023-05-09

## 2023-05-09 ENCOUNTER — OUTPATIENT (OUTPATIENT)
Dept: OUTPATIENT SERVICES | Facility: HOSPITAL | Age: 43
LOS: 1 days | End: 2023-05-09

## 2023-05-10 ENCOUNTER — EMERGENCY (EMERGENCY)
Facility: HOSPITAL | Age: 43
LOS: 1 days | Discharge: HOME | End: 2023-05-10
Attending: STUDENT IN AN ORGANIZED HEALTH CARE EDUCATION/TRAINING PROGRAM | Admitting: STUDENT IN AN ORGANIZED HEALTH CARE EDUCATION/TRAINING PROGRAM
Payer: MEDICAID

## 2023-05-10 VITALS
SYSTOLIC BLOOD PRESSURE: 118 MMHG | OXYGEN SATURATION: 100 % | WEIGHT: 199.96 LBS | RESPIRATION RATE: 18 BRPM | HEIGHT: 66 IN | DIASTOLIC BLOOD PRESSURE: 86 MMHG | TEMPERATURE: 98 F | HEART RATE: 77 BPM

## 2023-05-10 VITALS
RESPIRATION RATE: 18 BRPM | TEMPERATURE: 98 F | HEART RATE: 72 BPM | DIASTOLIC BLOOD PRESSURE: 78 MMHG | SYSTOLIC BLOOD PRESSURE: 112 MMHG | OXYGEN SATURATION: 98 %

## 2023-05-10 DIAGNOSIS — R11.0 NAUSEA: ICD-10-CM

## 2023-05-10 DIAGNOSIS — Z88.0 ALLERGY STATUS TO PENICILLIN: ICD-10-CM

## 2023-05-10 DIAGNOSIS — Z87.09 PERSONAL HISTORY OF OTHER DISEASES OF THE RESPIRATORY SYSTEM: ICD-10-CM

## 2023-05-10 DIAGNOSIS — R42 DIZZINESS AND GIDDINESS: ICD-10-CM

## 2023-05-10 DIAGNOSIS — R51.9 HEADACHE, UNSPECIFIED: ICD-10-CM

## 2023-05-10 DIAGNOSIS — E03.9 HYPOTHYROIDISM, UNSPECIFIED: ICD-10-CM

## 2023-05-10 DIAGNOSIS — R41.9 UNSPECIFIED SYMPTOMS AND SIGNS INVOLVING COGNITIVE FUNCTIONS AND AWARENESS: ICD-10-CM

## 2023-05-10 DIAGNOSIS — K21.9 GASTRO-ESOPHAGEAL REFLUX DISEASE WITHOUT ESOPHAGITIS: ICD-10-CM

## 2023-05-10 DIAGNOSIS — Z20.822 CONTACT WITH AND (SUSPECTED) EXPOSURE TO COVID-19: ICD-10-CM

## 2023-05-10 LAB
ANION GAP SERPL CALC-SCNC: 8 MMOL/L — SIGNIFICANT CHANGE UP (ref 5–17)
BASOPHILS # BLD AUTO: 0.06 K/UL — SIGNIFICANT CHANGE UP (ref 0–0.2)
BASOPHILS NFR BLD AUTO: 0.7 % — SIGNIFICANT CHANGE UP (ref 0–2)
BUN SERPL-MCNC: 6 MG/DL — LOW (ref 7–23)
CALCIUM SERPL-MCNC: 9.4 MG/DL — SIGNIFICANT CHANGE UP (ref 8.4–10.5)
CHLORIDE SERPL-SCNC: 104 MMOL/L — SIGNIFICANT CHANGE UP (ref 96–108)
CO2 SERPL-SCNC: 26 MMOL/L — SIGNIFICANT CHANGE UP (ref 22–31)
CREAT SERPL-MCNC: 0.68 MG/DL — SIGNIFICANT CHANGE UP (ref 0.5–1.3)
EGFR: 110 ML/MIN/1.73M2 — SIGNIFICANT CHANGE UP
EOSINOPHIL # BLD AUTO: 0.31 K/UL — SIGNIFICANT CHANGE UP (ref 0–0.5)
EOSINOPHIL NFR BLD AUTO: 3.5 % — SIGNIFICANT CHANGE UP (ref 0–6)
GLUCOSE SERPL-MCNC: 91 MG/DL — SIGNIFICANT CHANGE UP (ref 70–99)
HCG SERPL-ACNC: <0 MIU/ML — SIGNIFICANT CHANGE UP
HCT VFR BLD CALC: 41.6 % — SIGNIFICANT CHANGE UP (ref 34.5–45)
HGB BLD-MCNC: 13.3 G/DL — SIGNIFICANT CHANGE UP (ref 11.5–15.5)
IMM GRANULOCYTES NFR BLD AUTO: 0.3 % — SIGNIFICANT CHANGE UP (ref 0–0.9)
LYMPHOCYTES # BLD AUTO: 2.64 K/UL — SIGNIFICANT CHANGE UP (ref 1–3.3)
LYMPHOCYTES # BLD AUTO: 29.6 % — SIGNIFICANT CHANGE UP (ref 13–44)
MCHC RBC-ENTMCNC: 25.9 PG — LOW (ref 27–34)
MCHC RBC-ENTMCNC: 32 GM/DL — SIGNIFICANT CHANGE UP (ref 32–36)
MCV RBC AUTO: 81.1 FL — SIGNIFICANT CHANGE UP (ref 80–100)
MONOCYTES # BLD AUTO: 0.57 K/UL — SIGNIFICANT CHANGE UP (ref 0–0.9)
MONOCYTES NFR BLD AUTO: 6.4 % — SIGNIFICANT CHANGE UP (ref 2–14)
NEUTROPHILS # BLD AUTO: 5.32 K/UL — SIGNIFICANT CHANGE UP (ref 1.8–7.4)
NEUTROPHILS NFR BLD AUTO: 59.5 % — SIGNIFICANT CHANGE UP (ref 43–77)
NRBC # BLD: 0 /100 WBCS — SIGNIFICANT CHANGE UP (ref 0–0)
PLATELET # BLD AUTO: 297 K/UL — SIGNIFICANT CHANGE UP (ref 150–400)
POTASSIUM SERPL-MCNC: 3.9 MMOL/L — SIGNIFICANT CHANGE UP (ref 3.5–5.3)
POTASSIUM SERPL-SCNC: 3.9 MMOL/L — SIGNIFICANT CHANGE UP (ref 3.5–5.3)
RBC # BLD: 5.13 M/UL — SIGNIFICANT CHANGE UP (ref 3.8–5.2)
RBC # FLD: 13.3 % — SIGNIFICANT CHANGE UP (ref 10.3–14.5)
SODIUM SERPL-SCNC: 138 MMOL/L — SIGNIFICANT CHANGE UP (ref 135–145)
WBC # BLD: 8.93 K/UL — SIGNIFICANT CHANGE UP (ref 3.8–10.5)
WBC # FLD AUTO: 8.93 K/UL — SIGNIFICANT CHANGE UP (ref 3.8–10.5)

## 2023-05-10 PROCEDURE — 96375 TX/PRO/DX INJ NEW DRUG ADDON: CPT | Mod: XU

## 2023-05-10 PROCEDURE — 70496 CT ANGIOGRAPHY HEAD: CPT | Mod: 26,MA

## 2023-05-10 PROCEDURE — 80048 BASIC METABOLIC PNL TOTAL CA: CPT

## 2023-05-10 PROCEDURE — 70498 CT ANGIOGRAPHY NECK: CPT | Mod: 26,MA

## 2023-05-10 PROCEDURE — 96374 THER/PROPH/DIAG INJ IV PUSH: CPT | Mod: XU

## 2023-05-10 PROCEDURE — 84702 CHORIONIC GONADOTROPIN TEST: CPT

## 2023-05-10 PROCEDURE — 99284 EMERGENCY DEPT VISIT MOD MDM: CPT | Mod: 25

## 2023-05-10 PROCEDURE — 85025 COMPLETE CBC W/AUTO DIFF WBC: CPT

## 2023-05-10 PROCEDURE — 99284 EMERGENCY DEPT VISIT MOD MDM: CPT

## 2023-05-10 PROCEDURE — 70450 CT HEAD/BRAIN W/O DYE: CPT | Mod: 26,59,MA

## 2023-05-10 PROCEDURE — 70496 CT ANGIOGRAPHY HEAD: CPT | Mod: MA

## 2023-05-10 PROCEDURE — 70498 CT ANGIOGRAPHY NECK: CPT | Mod: MA

## 2023-05-10 PROCEDURE — 70450 CT HEAD/BRAIN W/O DYE: CPT | Mod: MA

## 2023-05-10 PROCEDURE — 36415 COLL VENOUS BLD VENIPUNCTURE: CPT

## 2023-05-10 RX ORDER — METOCLOPRAMIDE HCL 10 MG
10 TABLET ORAL ONCE
Refills: 0 | Status: COMPLETED | OUTPATIENT
Start: 2023-05-10 | End: 2023-05-10

## 2023-05-10 RX ORDER — KETOROLAC TROMETHAMINE 30 MG/ML
15 SYRINGE (ML) INJECTION ONCE
Refills: 0 | Status: DISCONTINUED | OUTPATIENT
Start: 2023-05-10 | End: 2023-05-10

## 2023-05-10 RX ORDER — SODIUM CHLORIDE 9 MG/ML
1000 INJECTION, SOLUTION INTRAVENOUS ONCE
Refills: 0 | Status: COMPLETED | OUTPATIENT
Start: 2023-05-10 | End: 2023-05-10

## 2023-05-10 RX ORDER — DIPHENHYDRAMINE HCL 50 MG
25 CAPSULE ORAL ONCE
Refills: 0 | Status: COMPLETED | OUTPATIENT
Start: 2023-05-10 | End: 2023-05-10

## 2023-05-10 RX ADMIN — Medication 15 MILLIGRAM(S): at 12:46

## 2023-05-10 RX ADMIN — Medication 10 MILLIGRAM(S): at 12:46

## 2023-05-10 RX ADMIN — Medication 25 MILLIGRAM(S): at 12:45

## 2023-05-10 RX ADMIN — SODIUM CHLORIDE 1000 MILLILITER(S): 9 INJECTION, SOLUTION INTRAVENOUS at 12:46

## 2023-05-10 NOTE — ED PROVIDER NOTE - PATIENT PORTAL LINK FT
You can access the FollowMyHealth Patient Portal offered by [facility] by registering at the following website: [email or web address]. By joining [employer]’s FollowMyHealth portal, you will also be able to view your health information using other applications (apps) compatible with our system.

## 2023-05-10 NOTE — ED PROVIDER NOTE - CLINICAL SUMMARY MEDICAL DECISION MAKING FREE TEXT BOX
Symptoms have been ongoing for 5 days. Pt with normal VS in ED. No neuro deficits on exam. Suspect likely complex migraine vs less likely stroke. Plan for labs, symptom treatment, CT head, and CTA head/neck. Dispo pending results and reassessment. Symptoms have been ongoing for 5 days. Pt with normal VS in ED. No neuro deficits on exam. Suspect likely complex migraine vs less likely stroke. Plan for labs, symptom treatment, CT head, and CTA head/neck. Dispo pending results and reassessment.    -->Labs and imaging ok. Pt tolerating PO. Normal gait. Says headache improved, still has mild "brain fog/heaviness", will give outpt neuro f/u Symptoms have been ongoing for 5 days. Pt with normal VS in ED. No neuro deficits on exam. Suspect likely complex migraine vs less likely stroke. Plan for labs, symptom treatment, CT head, and CTA head/neck. Dispo pending results and reassessment.    -->Labs and imaging ok. Pt tolerating PO. Normal gait. Says headache improved, still has mild "brain fog/heaviness" but feeling better, will give outpt neuro f/u  No further indication for emergent or inpatient workup, pt stable and ready for discharge. Results, diagnosis and post-discharge plan including appropriate outpatient follow up were discussed and all questions answered

## 2023-05-10 NOTE — ED PROVIDER NOTE - PHYSICAL EXAMINATION
CONST: nontoxic NAD speaking in full sentences  HEAD: atraumatic  EYES: conjunctivae clear, PERRL, EOMI, no nystagmus  ENT: mmm  NECK: supple/FROM  CARD: rrr  CHEST: no stridor/retractions/tripoding  ABD: soft, nd, nttp, no rebound/guarding  EXT: FROM, symmetric distal pulses intact  SKIN: warm, dry, no rash  NEURO: a+ox3, no facial asymmetry, no aphasia, PERRL, EOMI, no neglect, CN II-XII intact, ftn wnl, neg pronator, 5/5 strength x4, gross sensation intact x4

## 2023-05-10 NOTE — ED PROVIDER NOTE - OBJECTIVE STATEMENT
41 y/o F with PMHx hypothyroid and ?stroke when she was 18 presents to ED with 5 days of left sided headache, dizziness, and nausea. Dizziness is described as lightheadedness with some room spinning sensation that worsens when moving her head around and improves when she stays in one position. Pt states she was trying to ignore the symptoms but they have been persistent so she became very concerned and wants to make sure she did not have a stroke. Denies numbness/weakness of extremities, vision changes, or speech changes.

## 2023-05-10 NOTE — ED PROVIDER NOTE - NSFOLLOWUPINSTRUCTIONS_ED_ALL_ED_FT
Please reach out to Chika Lemon ([facility] ED clinical referral coordinator) to assist you with your follow-up appointment.     Monday - Friday 11am-7pm  [phone]  [email or web address]  ___  Dizziness    Dizziness is a common problem. It is a feeling of unsteadiness or light-headedness. You may feel like you are about to faint. Dizziness can lead to injury if you stumble or fall. Anyone can become dizzy, but dizziness is more common in older adults. This condition can be caused by a number of things, including medicines, dehydration, or illness.    Follow these instructions at home:  Eating and drinking    A comparison of three sample cups showing dark yellow, yellow, and pale yellow urine.  Drink enough fluid to keep your urine pale yellow. This helps to keep you from becoming dehydrated. Try to drink more clear fluids, such as water.  Do not drink alcohol.  Limit your caffeine intake if told to do so by your health care provider. Check ingredients and nutrition facts to see if a food or beverage contains caffeine.  Limit your salt (sodium) intake if told to do so by your health care provider. Check ingredients and nutrition facts to see if a food or beverage contains sodium.  Activity    A sign showing that a person should not drive.  Avoid making quick movements.  Rise slowly from chairs and steady yourself until you feel okay.  In the morning, first sit up on the side of the bed. When you feel okay, stand slowly while you hold onto something until you know that your balance is good.  If you need to  one place for a long time, move your legs often. Tighten and relax the muscles in your legs while you are standing.  Do not drive or use machinery if you feel dizzy.  Avoid bending down if you feel dizzy. Place items in your home so that they are easy for you to reach without leaning over.  Lifestyle    Do not use any products that contain nicotine or tobacco. These products include cigarettes, chewing tobacco, and vaping devices, such as e-cigarettes. If you need help quitting, ask your health care provider.  Try to reduce your stress level by using methods such as yoga or meditation. Talk with your health care provider if you need help to manage your stress.  General instructions    Watch your dizziness for any changes.  Take over-the-counter and prescription medicines only as told by your health care provider. Talk with your health care provider if you think that your dizziness is caused by a medicine that you are taking.  Tell a friend or a family member that you are feeling dizzy. If he or she notices any changes in your behavior, have this person call your health care provider.  Keep all follow-up visits. This is important.  Contact a health care provider if:  Your dizziness does not go away or you have new symptoms.  Your dizziness or light-headedness gets worse.  You feel nauseous.  You have reduced hearing.  You have a fever.  You have neck pain or a stiff neck.  Your dizziness leads to an injury or a fall.  Get help right away if:  You vomit or have diarrhea and are unable to eat or drink anything.  You have problems talking, walking, swallowing, or using your arms, hands, or legs.  You feel generally weak.  You have any bleeding.  You are not thinking clearly or you have trouble forming sentences. It may take a friend or family member to notice this.  You have chest pain, abdominal pain, shortness of breath, or sweating.  Your vision changes or you develop a severe headache.  These symptoms may represent a serious problem that is an emergency. Do not wait to see if the symptoms will go away. Get medical help right away. Call your local emergency services (911 in the [place] Do not drive yourself to the hospital.    Summary  Dizziness is a feeling of unsteadiness or light-headedness. This condition can be caused by a number of things, including medicines, dehydration, or illness.  Anyone can become dizzy, but dizziness is more common in older adults.  Drink enough fluid to keep your urine pale yellow. Do not drink alcohol.  Avoid making quick movements if you feel dizzy. Monitor your dizziness for any changes.  This information is not intended to replace advice given to you by your health care provider. Make sure you discuss any questions you have with your health care provider.    Document Revised: 11/22/2021 Document Reviewed: 11/22/2021  Elsevier Patient Education © 2023 Elsevier Inc.

## 2023-05-10 NOTE — ED PROVIDER NOTE - CARE PROVIDER_API CALL
Chidi Quiñonez (MD)  Clinical Neurophysiology; Neurology  [address] [place] [address]  Phone: [phone]  Fax: [phone]  Follow Up Time:

## 2023-05-10 NOTE — ED ADULT NURSE NOTE - NSFALLRISKINTERV_ED_ALL_ED

## 2023-05-10 NOTE — ED PROVIDER NOTE - CARE PLAN
1 Principal Discharge DX:	Headache  Secondary Diagnosis:	Brain fog  Secondary Diagnosis:	Lightheaded

## 2023-05-10 NOTE — ED ADULT NURSE REASSESSMENT NOTE - NS ED NURSE REASSESS COMMENT FT1
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 I have the worst headache of my life and I cant walk". Pt upgraded from south side to north. Charge CARLOS A Messina notified.

## 2023-05-15 NOTE — ED ADULT NURSE NOTE - ALCOHOL PRE SCREEN (AUDIT - C)
[de-identified] : We discussed further treatment options.  He will try some physical therapy and a muscle relaxant.  MRI if not improved or worsen.
Statement Selected

## 2023-05-30 ENCOUNTER — APPOINTMENT (OUTPATIENT)
Dept: HEART AND VASCULAR | Facility: CLINIC | Age: 43
End: 2023-05-30
Payer: MEDICAID

## 2023-05-30 ENCOUNTER — NON-APPOINTMENT (OUTPATIENT)
Age: 43
End: 2023-05-30

## 2023-05-30 VITALS
TEMPERATURE: 96 F | BODY MASS INDEX: 32.53 KG/M2 | OXYGEN SATURATION: 99 % | DIASTOLIC BLOOD PRESSURE: 77 MMHG | WEIGHT: 200 LBS | SYSTOLIC BLOOD PRESSURE: 113 MMHG | HEIGHT: 65.75 IN | HEART RATE: 70 BPM

## 2023-05-30 DIAGNOSIS — R06.02 SHORTNESS OF BREATH: ICD-10-CM

## 2023-05-30 DIAGNOSIS — E66.9 OBESITY, UNSPECIFIED: ICD-10-CM

## 2023-05-30 DIAGNOSIS — R07.9 CHEST PAIN, UNSPECIFIED: ICD-10-CM

## 2023-05-30 PROCEDURE — 93000 ELECTROCARDIOGRAM COMPLETE: CPT

## 2023-05-30 PROCEDURE — 99204 OFFICE O/P NEW MOD 45 MIN: CPT | Mod: 25

## 2023-05-30 NOTE — CARDIOLOGY SUMMARY
[de-identified] : 5/30/23: NSR at 68 bpm, no ST-T changes [de-identified] : 9/6/2019: Ca score 0, normal cors

## 2023-05-30 NOTE — DISCUSSION/SUMMARY
[FreeTextEntry1] : 42 year old woman, recently quit smoking (pack per week for 15 yrs), family hx of CVA in mother and father is here to re-establish care.\par \par Atypical chest pain:\par -Check TTE\par -Check Exercise treadmill test to evaluate exercise tolerance, any ECG changes with exercise and any arrhythmias. Cardiac CT normal in 9/2019\par -Check A1c and lipids for risk factor modification.   \par - heart healthy diet discussed with patient [EKG obtained to assist in diagnosis and management of assessed problem(s)] : EKG obtained to assist in diagnosis and management of assessed problem(s)

## 2023-05-30 NOTE — HISTORY OF PRESENT ILLNESS
[FreeTextEntry1] : 42 year old woman, recently quit smoking (pack per week for 15 yrs), family hx of CVA in mother and father is here to re-establish care.\par \par Pt is chronically under a lot of stress as she is raising 2 autistic boys. \par \par 5/30/23: Pt complains of intermittent pinching left sided chest pain that lasts a few minutes. It is independent of activity and does not radiate.  No shortness of breath. No dyspnea. Unlimited exercise tolerance. No orthopnea. No PND.\par

## 2023-05-31 ENCOUNTER — NON-APPOINTMENT (OUTPATIENT)
Age: 43
End: 2023-05-31

## 2023-05-31 LAB
CHOLEST SERPL-MCNC: 239 MG/DL
ESTIMATED AVERAGE GLUCOSE: 120 MG/DL
HBA1C MFR BLD HPLC: 5.8 %
HDLC SERPL-MCNC: 50 MG/DL
LDLC SERPL CALC-MCNC: 160 MG/DL
NONHDLC SERPL-MCNC: 189 MG/DL
TRIGL SERPL-MCNC: 145 MG/DL

## 2023-05-31 NOTE — REASON FOR VISIT
[FreeTextEntry1] : Called patient and discussed results of her lipid profile and A1c\par \par LDL is 160 \par Recommended dietary modifications. Will re-check on next visit

## 2023-06-28 ENCOUNTER — OUTPATIENT (OUTPATIENT)
Dept: OUTPATIENT SERVICES | Facility: HOSPITAL | Age: 43
LOS: 1 days | End: 2023-06-28

## 2023-06-28 DIAGNOSIS — R07.9 CHEST PAIN, UNSPECIFIED: ICD-10-CM

## 2023-09-20 NOTE — ED ADULT NURSE NOTE - NS ED NURSE RECORD ANOTHER VITAL SIGN
IZZY Conn, please contact the office ([phone]), [phone]; [facility] Helpline [phone]; [facility] Emergency Room [phone]; text HELLO to [phone]; or [phone].     
Yes

## 2023-10-27 ENCOUNTER — EMERGENCY (EMERGENCY)
Facility: HOSPITAL | Age: 43
LOS: 1 days | Discharge: HOME | End: 2023-10-27
Attending: EMERGENCY MEDICINE | Admitting: EMERGENCY MEDICINE
Payer: MEDICAID

## 2023-10-27 VITALS
OXYGEN SATURATION: 99 % | DIASTOLIC BLOOD PRESSURE: 85 MMHG | RESPIRATION RATE: 16 BRPM | SYSTOLIC BLOOD PRESSURE: 118 MMHG | TEMPERATURE: 98 F | HEART RATE: 56 BPM | WEIGHT: 197.98 LBS

## 2023-10-27 VITALS
RESPIRATION RATE: 16 BRPM | SYSTOLIC BLOOD PRESSURE: 133 MMHG | TEMPERATURE: 98 F | DIASTOLIC BLOOD PRESSURE: 87 MMHG | OXYGEN SATURATION: 98 % | HEART RATE: 65 BPM

## 2023-10-27 DIAGNOSIS — K92.1 MELENA: ICD-10-CM

## 2023-10-27 DIAGNOSIS — E03.9 HYPOTHYROIDISM, UNSPECIFIED: ICD-10-CM

## 2023-10-27 DIAGNOSIS — E78.5 HYPERLIPIDEMIA, UNSPECIFIED: ICD-10-CM

## 2023-10-27 DIAGNOSIS — N30.91 CYSTITIS, UNSPECIFIED WITH HEMATURIA: ICD-10-CM

## 2023-10-27 DIAGNOSIS — Z88.0 ALLERGY STATUS TO PENICILLIN: ICD-10-CM

## 2023-10-27 DIAGNOSIS — Z87.59 PERSONAL HISTORY OF OTHER COMPLICATIONS OF PREGNANCY, CHILDBIRTH AND THE PUERPERIUM: ICD-10-CM

## 2023-10-27 DIAGNOSIS — Z88.1 ALLERGY STATUS TO OTHER ANTIBIOTIC AGENTS STATUS: ICD-10-CM

## 2023-10-27 DIAGNOSIS — K64.9 UNSPECIFIED HEMORRHOIDS: ICD-10-CM

## 2023-10-27 DIAGNOSIS — R10.9 UNSPECIFIED ABDOMINAL PAIN: ICD-10-CM

## 2023-10-27 LAB
ALBUMIN SERPL ELPH-MCNC: 3.8 G/DL — SIGNIFICANT CHANGE UP (ref 3.3–5)
ALBUMIN SERPL ELPH-MCNC: 3.8 G/DL — SIGNIFICANT CHANGE UP (ref 3.3–5)
ALP SERPL-CCNC: 55 U/L — SIGNIFICANT CHANGE UP (ref 40–120)
ALP SERPL-CCNC: 55 U/L — SIGNIFICANT CHANGE UP (ref 40–120)
ALT FLD-CCNC: 7 U/L — LOW (ref 10–45)
ALT FLD-CCNC: 7 U/L — LOW (ref 10–45)
ANION GAP SERPL CALC-SCNC: 7 MMOL/L — SIGNIFICANT CHANGE UP (ref 5–17)
ANION GAP SERPL CALC-SCNC: 7 MMOL/L — SIGNIFICANT CHANGE UP (ref 5–17)
APPEARANCE UR: CLEAR — SIGNIFICANT CHANGE UP
APPEARANCE UR: CLEAR — SIGNIFICANT CHANGE UP
AST SERPL-CCNC: 12 U/L — SIGNIFICANT CHANGE UP (ref 10–40)
AST SERPL-CCNC: 12 U/L — SIGNIFICANT CHANGE UP (ref 10–40)
BASOPHILS # BLD AUTO: 0.05 K/UL — SIGNIFICANT CHANGE UP (ref 0–0.2)
BASOPHILS # BLD AUTO: 0.05 K/UL — SIGNIFICANT CHANGE UP (ref 0–0.2)
BASOPHILS NFR BLD AUTO: 0.8 % — SIGNIFICANT CHANGE UP (ref 0–2)
BASOPHILS NFR BLD AUTO: 0.8 % — SIGNIFICANT CHANGE UP (ref 0–2)
BILIRUB SERPL-MCNC: 0.5 MG/DL — SIGNIFICANT CHANGE UP (ref 0.2–1.2)
BILIRUB SERPL-MCNC: 0.5 MG/DL — SIGNIFICANT CHANGE UP (ref 0.2–1.2)
BILIRUB UR-MCNC: NEGATIVE — SIGNIFICANT CHANGE UP
BILIRUB UR-MCNC: NEGATIVE — SIGNIFICANT CHANGE UP
BUN SERPL-MCNC: 8 MG/DL — SIGNIFICANT CHANGE UP (ref 7–23)
BUN SERPL-MCNC: 8 MG/DL — SIGNIFICANT CHANGE UP (ref 7–23)
CALCIUM SERPL-MCNC: 9 MG/DL — SIGNIFICANT CHANGE UP (ref 8.4–10.5)
CALCIUM SERPL-MCNC: 9 MG/DL — SIGNIFICANT CHANGE UP (ref 8.4–10.5)
CHLORIDE SERPL-SCNC: 105 MMOL/L — SIGNIFICANT CHANGE UP (ref 96–108)
CHLORIDE SERPL-SCNC: 105 MMOL/L — SIGNIFICANT CHANGE UP (ref 96–108)
CO2 SERPL-SCNC: 25 MMOL/L — SIGNIFICANT CHANGE UP (ref 22–31)
CO2 SERPL-SCNC: 25 MMOL/L — SIGNIFICANT CHANGE UP (ref 22–31)
COLOR SPEC: ABNORMAL
COLOR SPEC: ABNORMAL
CREAT SERPL-MCNC: 0.69 MG/DL — SIGNIFICANT CHANGE UP (ref 0.5–1.3)
CREAT SERPL-MCNC: 0.69 MG/DL — SIGNIFICANT CHANGE UP (ref 0.5–1.3)
DIFF PNL FLD: ABNORMAL
DIFF PNL FLD: ABNORMAL
EGFR: 109 ML/MIN/1.73M2 — SIGNIFICANT CHANGE UP
EGFR: 109 ML/MIN/1.73M2 — SIGNIFICANT CHANGE UP
EOSINOPHIL # BLD AUTO: 0.18 K/UL — SIGNIFICANT CHANGE UP (ref 0–0.5)
EOSINOPHIL # BLD AUTO: 0.18 K/UL — SIGNIFICANT CHANGE UP (ref 0–0.5)
EOSINOPHIL NFR BLD AUTO: 2.8 % — SIGNIFICANT CHANGE UP (ref 0–6)
EOSINOPHIL NFR BLD AUTO: 2.8 % — SIGNIFICANT CHANGE UP (ref 0–6)
GLUCOSE SERPL-MCNC: 94 MG/DL — SIGNIFICANT CHANGE UP (ref 70–99)
GLUCOSE SERPL-MCNC: 94 MG/DL — SIGNIFICANT CHANGE UP (ref 70–99)
GLUCOSE UR QL: NEGATIVE MG/DL — SIGNIFICANT CHANGE UP
GLUCOSE UR QL: NEGATIVE MG/DL — SIGNIFICANT CHANGE UP
HCG SERPL-ACNC: <0 MIU/ML — SIGNIFICANT CHANGE UP
HCG SERPL-ACNC: <0 MIU/ML — SIGNIFICANT CHANGE UP
HCT VFR BLD CALC: 42.1 % — SIGNIFICANT CHANGE UP (ref 34.5–45)
HCT VFR BLD CALC: 42.1 % — SIGNIFICANT CHANGE UP (ref 34.5–45)
HGB BLD-MCNC: 13.6 G/DL — SIGNIFICANT CHANGE UP (ref 11.5–15.5)
HGB BLD-MCNC: 13.6 G/DL — SIGNIFICANT CHANGE UP (ref 11.5–15.5)
IMM GRANULOCYTES NFR BLD AUTO: 0.3 % — SIGNIFICANT CHANGE UP (ref 0–0.9)
IMM GRANULOCYTES NFR BLD AUTO: 0.3 % — SIGNIFICANT CHANGE UP (ref 0–0.9)
KETONES UR-MCNC: NEGATIVE MG/DL — SIGNIFICANT CHANGE UP
KETONES UR-MCNC: NEGATIVE MG/DL — SIGNIFICANT CHANGE UP
LEUKOCYTE ESTERASE UR-ACNC: ABNORMAL
LEUKOCYTE ESTERASE UR-ACNC: ABNORMAL
LYMPHOCYTES # BLD AUTO: 2.02 K/UL — SIGNIFICANT CHANGE UP (ref 1–3.3)
LYMPHOCYTES # BLD AUTO: 2.02 K/UL — SIGNIFICANT CHANGE UP (ref 1–3.3)
LYMPHOCYTES # BLD AUTO: 31.6 % — SIGNIFICANT CHANGE UP (ref 13–44)
LYMPHOCYTES # BLD AUTO: 31.6 % — SIGNIFICANT CHANGE UP (ref 13–44)
MCHC RBC-ENTMCNC: 26.2 PG — LOW (ref 27–34)
MCHC RBC-ENTMCNC: 26.2 PG — LOW (ref 27–34)
MCHC RBC-ENTMCNC: 32.3 GM/DL — SIGNIFICANT CHANGE UP (ref 32–36)
MCHC RBC-ENTMCNC: 32.3 GM/DL — SIGNIFICANT CHANGE UP (ref 32–36)
MCV RBC AUTO: 81 FL — SIGNIFICANT CHANGE UP (ref 80–100)
MCV RBC AUTO: 81 FL — SIGNIFICANT CHANGE UP (ref 80–100)
MONOCYTES # BLD AUTO: 0.41 K/UL — SIGNIFICANT CHANGE UP (ref 0–0.9)
MONOCYTES # BLD AUTO: 0.41 K/UL — SIGNIFICANT CHANGE UP (ref 0–0.9)
MONOCYTES NFR BLD AUTO: 6.4 % — SIGNIFICANT CHANGE UP (ref 2–14)
MONOCYTES NFR BLD AUTO: 6.4 % — SIGNIFICANT CHANGE UP (ref 2–14)
NEUTROPHILS # BLD AUTO: 3.72 K/UL — SIGNIFICANT CHANGE UP (ref 1.8–7.4)
NEUTROPHILS # BLD AUTO: 3.72 K/UL — SIGNIFICANT CHANGE UP (ref 1.8–7.4)
NEUTROPHILS NFR BLD AUTO: 58.1 % — SIGNIFICANT CHANGE UP (ref 43–77)
NEUTROPHILS NFR BLD AUTO: 58.1 % — SIGNIFICANT CHANGE UP (ref 43–77)
NITRITE UR-MCNC: NEGATIVE — SIGNIFICANT CHANGE UP
NITRITE UR-MCNC: NEGATIVE — SIGNIFICANT CHANGE UP
NRBC # BLD: 0 /100 WBCS — SIGNIFICANT CHANGE UP (ref 0–0)
NRBC # BLD: 0 /100 WBCS — SIGNIFICANT CHANGE UP (ref 0–0)
PH UR: 6.5 — SIGNIFICANT CHANGE UP (ref 5–8)
PH UR: 6.5 — SIGNIFICANT CHANGE UP (ref 5–8)
PLATELET # BLD AUTO: 265 K/UL — SIGNIFICANT CHANGE UP (ref 150–400)
PLATELET # BLD AUTO: 265 K/UL — SIGNIFICANT CHANGE UP (ref 150–400)
POTASSIUM SERPL-MCNC: 4.2 MMOL/L — SIGNIFICANT CHANGE UP (ref 3.5–5.3)
POTASSIUM SERPL-MCNC: 4.2 MMOL/L — SIGNIFICANT CHANGE UP (ref 3.5–5.3)
POTASSIUM SERPL-SCNC: 4.2 MMOL/L — SIGNIFICANT CHANGE UP (ref 3.5–5.3)
POTASSIUM SERPL-SCNC: 4.2 MMOL/L — SIGNIFICANT CHANGE UP (ref 3.5–5.3)
PROT SERPL-MCNC: 7.1 G/DL — SIGNIFICANT CHANGE UP (ref 6–8.3)
PROT SERPL-MCNC: 7.1 G/DL — SIGNIFICANT CHANGE UP (ref 6–8.3)
PROT UR-MCNC: SIGNIFICANT CHANGE UP MG/DL
PROT UR-MCNC: SIGNIFICANT CHANGE UP MG/DL
RBC # BLD: 5.2 M/UL — SIGNIFICANT CHANGE UP (ref 3.8–5.2)
RBC # BLD: 5.2 M/UL — SIGNIFICANT CHANGE UP (ref 3.8–5.2)
RBC # FLD: 13.8 % — SIGNIFICANT CHANGE UP (ref 10.3–14.5)
RBC # FLD: 13.8 % — SIGNIFICANT CHANGE UP (ref 10.3–14.5)
SODIUM SERPL-SCNC: 137 MMOL/L — SIGNIFICANT CHANGE UP (ref 135–145)
SODIUM SERPL-SCNC: 137 MMOL/L — SIGNIFICANT CHANGE UP (ref 135–145)
SP GR SPEC: 1.01 — SIGNIFICANT CHANGE UP (ref 1–1.03)
SP GR SPEC: 1.01 — SIGNIFICANT CHANGE UP (ref 1–1.03)
UROBILINOGEN FLD QL: 0.2 MG/DL — SIGNIFICANT CHANGE UP (ref 0.2–1)
UROBILINOGEN FLD QL: 0.2 MG/DL — SIGNIFICANT CHANGE UP (ref 0.2–1)
WBC # BLD: 6.4 K/UL — SIGNIFICANT CHANGE UP (ref 3.8–10.5)
WBC # BLD: 6.4 K/UL — SIGNIFICANT CHANGE UP (ref 3.8–10.5)
WBC # FLD AUTO: 6.4 K/UL — SIGNIFICANT CHANGE UP (ref 3.8–10.5)
WBC # FLD AUTO: 6.4 K/UL — SIGNIFICANT CHANGE UP (ref 3.8–10.5)

## 2023-10-27 PROCEDURE — 82962 GLUCOSE BLOOD TEST: CPT

## 2023-10-27 PROCEDURE — 81001 URINALYSIS AUTO W/SCOPE: CPT

## 2023-10-27 PROCEDURE — 85025 COMPLETE CBC W/AUTO DIFF WBC: CPT

## 2023-10-27 PROCEDURE — 74178 CT ABD&PLV WO CNTR FLWD CNTR: CPT | Mod: MA

## 2023-10-27 PROCEDURE — 99285 EMERGENCY DEPT VISIT HI MDM: CPT

## 2023-10-27 PROCEDURE — 74178 CT ABD&PLV WO CNTR FLWD CNTR: CPT | Mod: 26,MA

## 2023-10-27 PROCEDURE — 84702 CHORIONIC GONADOTROPIN TEST: CPT

## 2023-10-27 PROCEDURE — 36415 COLL VENOUS BLD VENIPUNCTURE: CPT

## 2023-10-27 PROCEDURE — 80053 COMPREHEN METABOLIC PANEL: CPT

## 2023-10-27 PROCEDURE — 87086 URINE CULTURE/COLONY COUNT: CPT

## 2023-10-27 PROCEDURE — 99284 EMERGENCY DEPT VISIT MOD MDM: CPT | Mod: 25

## 2023-10-27 RX ORDER — ACETAMINOPHEN 500 MG
650 TABLET ORAL ONCE
Refills: 0 | Status: COMPLETED | OUTPATIENT
Start: 2023-10-27 | End: 2023-10-27

## 2023-10-27 RX ORDER — SODIUM CHLORIDE 9 MG/ML
1000 INJECTION INTRAMUSCULAR; INTRAVENOUS; SUBCUTANEOUS ONCE
Refills: 0 | Status: COMPLETED | OUTPATIENT
Start: 2023-10-27 | End: 2023-10-27

## 2023-10-27 RX ADMIN — Medication 650 MILLIGRAM(S): at 12:16

## 2023-10-27 RX ADMIN — Medication 1 TABLET(S): at 14:34

## 2023-10-27 RX ADMIN — SODIUM CHLORIDE 1000 MILLILITER(S): 9 INJECTION INTRAMUSCULAR; INTRAVENOUS; SUBCUTANEOUS at 12:16

## 2023-10-27 NOTE — ED PROVIDER NOTE - CARE PLAN
1 Principal Discharge DX:	Hematuria   Principal Discharge DX:	Hematuria  Secondary Diagnosis:	UTI (urinary tract infection)

## 2023-10-27 NOTE — ED ADULT NURSE NOTE - ALCOHOL PRE SCREEN (AUDIT - C)
Visit Information Date & Time Provider Department Dept. Phone Encounter #  
 10/4/2017 11:00 AM Chevy Barba MD [facility] OFFICE-ANNEX [phone] [id number] Follow-up Instructions Return in about 4 months (around 2/4/2018) for special healthcare needs. Your Appointments 10/5/2017 10:00 AM  
ESTABLISHED PATIENT with Nirali Holman MD  
[address] [facility] ([facility] Appt Note: referred by PCP for poss fecal impaction. Thiago patient, requested Queta Coppersmith [address] [facility], [address] [facility] [address]  
[phone] [address] [facility], [address]  
  
    
 10/11/2017 11:00 AM  
ESTABLISHED PATIENT with Everardo King,   
[facility]) Appt Note: fu  
 [address] [facility] [address]  
[phone] [address] [place] [address],[phone]  
  
    
 10/18/2017 11:00 AM  
ESTABLISHED PATIENT with Everardo King, PHD  
[facility] ([facility]) Appt Note: fu  
 [address] [facility] [address]  
[phone]  
  
    
 10/25/2017 11:00 AM  
ESTABLISHED PATIENT with Everardo King, PHD  
[facility]) Appt Note: fu  
 [address] [facility] [address] [place] [address]  
[phone]  
  
    
 12/15/2017  9:40 AM  
ESTABLISHED PATIENT with Vanessa Maria MD  
[facility] ([facility]) Appt Note: 4 month f/u - Puberty [address] [place] [address] [address]  
[phone] [address] [place] [address] Upcoming Health Maintenance Date Due  
 HPV AGE 9Y-34Y (1 of 2 - Female 2 Dose Series) 5/13/2019 MCV through Age 25 (1 of 2) 5/13/2019 DTaP/Tdap/Td series (6 - Tdap) 5/13/2019 Allergies as of 10/4/2017  Review Complete On: 10/4/2017 By: David King LPN Severity Noted Reaction Type Reactions Latex Medium 01/14/2011    Swelling Facial swelling Omnicef [Cefdinir]  03/08/2011    Nausea and Vomiting Penicillins  03/08/2011    Nausea and Vomiting Current Immunizations  Reviewed on 1/31/2017 Name Date DTaP 8/9/2012, 8/9/2012, 1/11/2010, 1/11/2010, 3/5/2009, 2008, 2008, 2008, 2008 [id number] 3/6/2009 Hep A Vaccine 8/9/2012, 1/11/2010 Hep B Vaccine 3/5/2009, 2008, 2008 Hib 1/11/2010, 3/5/2009, 2008, 2008 IPV 8/9/2012, 3/5/2009, 2008, 2008 Influenza Vaccine 9/18/2013, 10/16/2009, 2008 Influenza Vaccine (Quad) PF 10/2/2017, 10/31/2016, 10/16/2015 11:11 AM, 9/30/2014 MMR 8/9/2012, 1/11/2010 Pneumococcal Conjugate (PCV-7) 3/5/2009, 2008, 2008 Pneumococcal Polysaccharide (PPSV-23) 2/20/2014 Pneumococcal Vaccine (Unspecified Type) 1/11/2010, 3/5/2009, 2008, 2008 Poliovirus vaccine 8/9/2012, 3/5/2009, 2008, 2008 Rotavirus Vaccine 2008, 2008 Varicella Virus Vaccine 8/9/2012, 1/11/2010 Not reviewed this visit You Were Diagnosed With   
  
 Codes Comments Recurrent otitis media, left    -  Primary ICD-10-CM: H66.92 
ICD-9-CM: 382.9 Mono exposure     ICD-10-CM: Z20.828 ICD-9-CM: V01.79 Chronic fatigue     ICD-10-CM: R53.82 
ICD-9-CM: 780.79 Vitals BP Pulse Temp Resp Height(growth percentile) 96/41 (37 %/ 5 %)* (BP 1 Location: Right arm, BP Patient Position: Sitting) 80 99.8 °F (37.7 °C) (Axillary) 16 (!) 4' 3.5\" (1.308 m) (26 %, Z= -0.65) Weight(growth percentile) SpO2 BMI OB Status Smoking Status 68 lb 6.4 oz (31 kg) (54 %, Z= 0.10) 100% 18.13 kg/m2 (74 %, Z= 0.65) Premenarcheal Passive Smoke Exposure - Never Smoker *BP percentiles are based on NHBPEP's 4th Report Growth percentiles are based on CDC 2-20 Years data. BMI and BSA Data Body Mass Index Body Surface Area  
 18.13 kg/m 2 1.06 m 2 Preferred Pharmacy Pharmacy Name Phone [address], [facility] [place] [address] [phone] Your Updated Medication List  
  
   
This list is accurate as of: 10/4/17 12:55 PM.  Always use your most recent med list.  
  
  
  
  
 * albuterol 90 mcg/actuation inhaler Commonly known as:  PROVENTIL HFA, VENTOLIN HFA, PROAIR HFA Take 2 puffs every 4 hours as needed for cough and wheeze. With spacer. * albuterol 2.5 mg /3 mL (0.083 %) nebulizer solution Commonly known as:  PROVENTIL VENTOLIN  
3 mL by Nebulization route every four (4) hours as needed for Wheezing. albuterol-ipratropium 2.5 mg-0.5 mg/3 ml Nebu Commonly known as:  DUO-NEB  
3 mL by Nebulization route every four (4) hours as needed. AQUAPHOR HEALING 41 % ointment Generic drug:  pantothenic ac-min oil-pet,hyd  
APPLY TO AFFECTED AREA PRN FOR DRY SKIN  
  
 budesonide 0.5 mg/2 mL Nbsp Commonly known as:  PULMICORT  
2 mL by Nebulization route two (2) times a day. budesonide-formoterol 80-4.5 mcg/actuation Hfaa Commonly known as:  SYMBICORT Take 2 puffs twice a day with spacer  
  
 busPIRone 5 mg tablet Commonly known as:  BUSPAR Take 1 Tab by mouth daily (with breakfast). desonide 0.05 % cream  
Commonly known as:  Loyda Domenico Apply  to affected area two (2) times a day. fluticasone 50 mcg/actuation nasal spray Commonly known as:  Jacki Reges Take 1 spray each nostril once a da y  
  
 loratadine 5 mg/5 mL syrup Commonly known as:  Randy Sor Take 10 mL by mouth daily. mineral oil-hydrophil petrolat ointment Commonly known as:  AQUAPHOR Apply  to affected area as needed for Dry Skin. ondansetron 4 mg disintegrating tablet Commonly known as:  ZOFRAN ODT Take 1 Tab by mouth every eight (8) hours as needed for Nausea. * sodium chloride 0.9 % Nebu 2.5 mL by Nebulization route as needed. Neb 1 vial via neb every 4 hours as needed * sodium chloride 0.9 % Nebu  
3 mL by Nebulization route as needed. Take one neb in the am and every 4 hours as needed * ZITHROMAX 200 mg/5 mL suspension Generic drug:  azithromycin Take 6 mL by mouth every Monday and Thursday. * azithromycin 200 mg/5 mL suspension Commonly known as:  Desmond Moore Take 8 mL by mouth once for 1 dose. Then 4 ml by mouth days 2-5 days * Notice: This list has 6 medication(s) that are the same as other medications prescribed for you. Read the directions carefully, and ask your doctor or other care provider to review them with you. Prescriptions Sent to Pharmacy Refills  
 azithromycin (ZITHROMAX) 200 mg/5 mL suspension 0 Sig: Take 8 mL by mouth once for 1 dose. Then 4 ml by mouth days 2-5 days Class: Normal  
 Pharmacy: [facility] [address] [place] [facility] #: [phone] Route: Oral  
  
We Performed the Following [facility] PANEL [id number] CPT(R)] POC HETEROPHILE ANTIBODY SCREEN [50602 CPT(R)] Follow-up Instructions Return in about 4 months (around 2/4/2018) for special healthcare needs. Patient Instructions Start giving Zithromax therapeutic dose: 8 ml day one, then 4 ml days 2 through 5. Restart Zithromax prophylaxis in 2 weeks. Introducing [facility]! Dear Parent or Guardian, Thank you for requesting a [employer] account for your child. With [employer], you can view your childs hospital or ER discharge instructions, current allergies, immunizations and much more.    
In order to access your childs information, we require a signed consent on file. Please see the [facility] department or call [phone] for instructions on completing a [employer] Proxy request.   
Additional Information If you have questions, please visit the Frequently Asked Questions section of the [employer] website at [email or web address] Remember, [employer]k is NOT to be used for urgent needs. For medical emergencies, dial 911. Now available from your iPhone and Android! Please provide this summary of care documentation to your next provider. Your primary care clinician is listed as [facility]. If you have any questions after today's visit, please call [phone]. Statement Selected

## 2023-10-27 NOTE — ED ADULT TRIAGE NOTE - CHIEF COMPLAINT QUOTE
Pt presents to the ED for back pain x 1 week with blood in urine, blood in stool, and lightheadedness since yesterday. Pt also endorses one episode of vomiting today with chills. Pt took Tylenol at 0400. Pt denies PMH.

## 2023-10-27 NOTE — ED PROVIDER NOTE - OBJECTIVE STATEMENT
43F with a h/o hypothyroidism, HLD, ectopic pregnancy in the past who p/w 43F with a h/o hypothyroidism, HLD, ectopic pregnancy in the past who p/w right flank pain x 1 month, worse since Oct 1st and associated with hematuria. Pt states pain is constant, intermittent gets worse and her back feels "swollen", associated with n/v and mahamed chills yesterday. She also reports hemorrhoids and small amount of blood on her stool with BM - unrelated to her flank pain. She has taken tylenol with some relief - none today. No dysuria or increased frequency, no fevers, no dizziness or syncope, no trauma or fall.  States she cannot take nsaids bc it causes bleeding.

## 2023-10-27 NOTE — ED PROVIDER NOTE - CLINICAL SUMMARY MEDICAL DECISION MAKING FREE TEXT BOX
43F with a h/o hypothyroidism, HLD, ectopic pregnancy in the past who p/w right flank pain x 1 month, worse since Oct 1st and associated with hematuria. Pt states pain is constant, intermittent gets worse and her back feels "swollen", associated with n/v and mahamed chills yesterday. She also reports hemorrhoids and small amount of blood on her stool with BM - unrelated to her flank pain. She has taken tylenol with some relief - none today. No dysuria or increased frequency, no fevers, no dizziness or syncope, no trauma or fall.  States she cannot take nsaids bc it causes bleeding. 43F with a h/o hypothyroidism, HLD, ectopic pregnancy in the past who p/w right flank pain x 1 month, worse since Oct 1st and associated with hematuria. Pt states pain is constant, intermittent gets worse and her back feels "swollen", associated with n/v and mahamed chills yesterday. She also reports hemorrhoids and small amount of blood on her stool with BM - unrelated to her flank pain. She has taken tylenol with some relief - none today. No dysuria or increased frequency, no fevers, no dizziness or syncope, no trauma or fall.  States she cannot take nsaids bc it causes bleeding.  VSS, pt well-appearing, right CVAT, plan for labs, UA, IVFs tylenol for pain.   Labs sig for hematuria. CT a/p performed and neg for stone or renal mass, shows bladder wall thickening/cystitis.   Will DC on course of abx (pt reports allergy to pcn and cipro) and recommend fu with urology for cystoscopy to r/o mass. Pt verbalized understanding.   Pt feeling improved and is stable for DC. ED evaluation and management discussed with the patient in detail.  Close PMD follow up encouraged.  Strict ED return instructions discussed in detail and patient given the opportunity to ask any questions about their discharge diagnosis and instructions. Patient verbalized understanding.

## 2023-10-27 NOTE — ED PROVIDER NOTE - PHYSICAL EXAMINATION
GEN: Well appearing, well developed, awake, alert, oriented to person, place, time/situation and in no apparent distress. NTAF  ENT: Airway patent, Nasal mucosa clear. Mouth with normal mucosa.  EYES: Clear bilaterally. PERRL, EOMI  RESPIRATORY: Breathing comfortably with normal RR. No W/C/R, no hypoxia or resp distress.  CARDIAC: Regular rate and rhythm, no M/R/G  ABDOMEN: Soft, nontender, +bowel sounds, no rebound, rigidity, or guarding. R CVA TTP  MSK: Range of motion is not limited, no deformities noted.  NEURO: Alert and oriented, no focal deficits.  SKIN: Skin normal color for race, warm, dry and intact. No evidence of rash.  PSYCH: Alert and oriented to person, place, time/situation. normal mood and affect. no apparent risk to self or others.

## 2023-10-27 NOTE — ED ADULT NURSE NOTE - OBJECTIVE STATEMENT
43y F pshx appendectomy, L fallopian tube removal, presents to ED c/o urinar sx [id number]. Endorses R flank pain, hematuria onset 1 month ago, seen by PCP with no resolution. Reports pain worsening over last week a/w lightheadedness, vomiting, fatigue, chills yesterday. Denies syncope, blurry vision, coffee grounds emesis, focal weakness, CP/SOB. Pt AAOx4, speaking in full and clear sentences, ambulatory with steady gait on arrival.

## 2023-10-27 NOTE — ED PROVIDER NOTE - PATIENT PORTAL LINK FT
You can access the FollowMyHealth Patient Portal offered by [facility] by registering at the following website: [email or web address]. By joining [employer]’s FollowMyHealth portal, you will also be able to view your health information using other applications (apps) compatible with our system.

## 2023-10-27 NOTE — ED ADULT NURSE NOTE - NSFALLUNIVINTERV_ED_ALL_ED
Bed/Stretcher in lowest position, wheels locked, appropriate side rails in place/Call bell, personal items and telephone in reach/Instruct patient to call for assistance before getting out of bed/chair/stretcher/Non-slip footwear applied when patient is off stretcher/[place] to call system/Physically safe environment - no spills, clutter or unnecessary equipment/Purposeful proactive rounding/Room/bathroom lighting operational, light cord in reach

## 2023-10-27 NOTE — ED PROVIDER NOTE - NSFOLLOWUPINSTRUCTIONS_ED_ALL_ED_FT
1. You were seen for  hematuria and uti  . A copy of any of your resulted labs, imaging, and findings have been provided to you. Make sure to view any test results that may not have yet resulted at the time of your discharge by creating a FollowMyHealth account at: [email or web address] to sign up for FollowMyHealth.   2. Continue to take your home medications as prescribed.   3. Please follow up with your primary care physician. You may call our referrals coordinator at [phone] Monday to Friday 11am-7pm for assistance with making an appointment. Or you can call [phone] to make an appointment.  4. Return to the emergency department for new, persistent, or worsening symptoms or signs, or for any concerning symptoms.   5. For your for health, you should make healthy food choices and be physically active. Also, you should not smoke or use drugs, and you should not drink alcohol in excess. Please visit [email or web address] for resources and more information.    Hematuria, Adult    Hematuria is blood in the urine. Blood may be visible in the urine, or it may be identified with a test. This condition can be caused by infections of the bladder, urethra, kidney, or prostate. Other possible causes include:  Kidney stones.  Cancer of the urinary tract.  Too much calcium in the urine.  Conditions that are passed from parent to child (inherited conditions).  Exercise that requires a lot of energy.  Infections can usually be treated with medicine, and a kidney stone usually will pass through your urine. If neither of these is the cause of your hematuria, more tests may be needed to identify the cause of your symptoms.    It is very important to tell your health care provider about any blood in your urine, even if it is painless or the blood stops without treatment. Blood in the urine, when it happens and then stops and then happens again, can be a symptom of a very serious condition, including cancer. There is no pain in the initial stages of many urinary cancers.    Follow these instructions at home:  Medicines    Take over-the-counter and prescription medicines only as told by your health care provider.  If you were prescribed an antibiotic medicine, take it as told by your health care provider. Do not stop taking the antibiotic even if you start to feel better.  Eating and drinking    Drink enough fluid to keep your urine pale yellow. It is recommended that you drink 3–4 quarts (2.8–3.8 L) a day. If you have been diagnosed with an infection, drinking cranberry juice in addition to large amounts of water is recommended.  Avoid caffeine, tea, and carbonated beverages. These tend to irritate the bladder.  Avoid alcohol because it may irritate the prostate (in males).  General instructions    If you have been diagnosed with a kidney stone, follow your health care provider's instructions about straining your urine to catch the stone.  Empty your bladder often. Avoid holding urine for long periods of time.  If you are female:  After a bowel movement, wipe from front to back and use each piece of toilet paper only once.  Empty your bladder before and after sex.  Pay attention to any changes in your symptoms. Tell your health care provider about any changes or any new symptoms.  It is up to you to get the results of any tests. Ask your health care provider, or the department that is doing the test, when your results will be ready.  Keep all follow-up visits. This is important.  Contact a health care provider if:  You develop back pain.  You have a fever or chills.  You have nausea or vomiting.  Your symptoms do not improve after 3 days.  Your symptoms get worse.  Get help right away if:  You develop severe vomiting and are unable to take medicine without vomiting.  You develop severe pain in your back or abdomen even though you are taking medicine.  You pass a large amount of blood in your urine.  You pass blood clots in your urine.  You feel very weak or like you might faint.  You faint.  Summary  Hematuria is blood in the urine. It has many possible causes.  It is very important that you tell your health care provider about any blood in your urine, even if it is painless or the blood stops without treatment.  Take over-the-counter and prescription medicines only as told by your health care provider.  Drink enough fluid to keep your urine pale yellow.  This information is not intended to replace advice given to you by your health care provider. Make sure you discuss any questions you have with your health care provider.

## 2023-11-03 ENCOUNTER — NON-APPOINTMENT (OUTPATIENT)
Age: 43
End: 2023-11-03

## 2023-11-03 ENCOUNTER — APPOINTMENT (OUTPATIENT)
Dept: UROLOGY | Facility: CLINIC | Age: 43
End: 2023-11-03
Payer: MEDICAID

## 2023-11-03 DIAGNOSIS — R31.0 GROSS HEMATURIA: ICD-10-CM

## 2023-11-03 PROCEDURE — 52000 CYSTOURETHROSCOPY: CPT

## 2023-11-03 PROCEDURE — 99203 OFFICE O/P NEW LOW 30 MIN: CPT | Mod: 25

## 2023-11-09 ENCOUNTER — APPOINTMENT (OUTPATIENT)
Dept: OBGYN | Facility: CLINIC | Age: 43
End: 2023-11-09
Payer: MEDICAID

## 2023-11-09 VITALS — SYSTOLIC BLOOD PRESSURE: 120 MMHG | DIASTOLIC BLOOD PRESSURE: 70 MMHG

## 2023-11-09 DIAGNOSIS — G89.29 PELVIC AND PERINEAL PAIN: ICD-10-CM

## 2023-11-09 DIAGNOSIS — N89.8 OTHER SPECIFIED NONINFLAMMATORY DISORDERS OF VAGINA: ICD-10-CM

## 2023-11-09 DIAGNOSIS — R10.2 PELVIC AND PERINEAL PAIN: ICD-10-CM

## 2023-11-09 DIAGNOSIS — N39.41 URGE INCONTINENCE: ICD-10-CM

## 2023-11-09 PROCEDURE — 99214 OFFICE O/P EST MOD 30 MIN: CPT

## 2023-11-20 ENCOUNTER — APPOINTMENT (OUTPATIENT)
Dept: MRI IMAGING | Facility: HOSPITAL | Age: 43
End: 2023-11-20

## 2023-12-11 ENCOUNTER — NON-APPOINTMENT (OUTPATIENT)
Age: 43
End: 2023-12-11

## 2023-12-15 LAB — URINE CYTOLOGY: NORMAL

## 2024-02-05 ENCOUNTER — APPOINTMENT (OUTPATIENT)
Dept: OBGYN | Facility: CLINIC | Age: 44
End: 2024-02-05

## 2024-02-06 ENCOUNTER — APPOINTMENT (OUTPATIENT)
Dept: OBGYN | Facility: CLINIC | Age: 44
End: 2024-02-06

## 2024-02-09 ENCOUNTER — APPOINTMENT (OUTPATIENT)
Dept: UROLOGY | Facility: CLINIC | Age: 44
End: 2024-02-09

## 2024-02-13 ENCOUNTER — EMERGENCY (EMERGENCY)
Facility: HOSPITAL | Age: 44
LOS: 1 days | Discharge: HOME | End: 2024-02-13
Attending: EMERGENCY MEDICINE | Admitting: EMERGENCY MEDICINE
Payer: MEDICAID

## 2024-02-13 VITALS
DIASTOLIC BLOOD PRESSURE: 67 MMHG | OXYGEN SATURATION: 100 % | SYSTOLIC BLOOD PRESSURE: 109 MMHG | HEART RATE: 92 BPM | HEIGHT: 67 IN | WEIGHT: 171.96 LBS | RESPIRATION RATE: 18 BRPM | TEMPERATURE: 98 F

## 2024-02-13 VITALS
SYSTOLIC BLOOD PRESSURE: 99 MMHG | HEART RATE: 76 BPM | OXYGEN SATURATION: 99 % | RESPIRATION RATE: 17 BRPM | TEMPERATURE: 98 F | DIASTOLIC BLOOD PRESSURE: 67 MMHG

## 2024-02-13 DIAGNOSIS — Z90.49 ACQUIRED ABSENCE OF OTHER SPECIFIED PARTS OF DIGESTIVE TRACT: ICD-10-CM

## 2024-02-13 DIAGNOSIS — E03.9 HYPOTHYROIDISM, UNSPECIFIED: ICD-10-CM

## 2024-02-13 DIAGNOSIS — R20.2 PARESTHESIA OF SKIN: ICD-10-CM

## 2024-02-13 DIAGNOSIS — R11.2 NAUSEA WITH VOMITING, UNSPECIFIED: ICD-10-CM

## 2024-02-13 DIAGNOSIS — R19.7 DIARRHEA, UNSPECIFIED: ICD-10-CM

## 2024-02-13 DIAGNOSIS — Z88.0 ALLERGY STATUS TO PENICILLIN: ICD-10-CM

## 2024-02-13 DIAGNOSIS — R42 DIZZINESS AND GIDDINESS: ICD-10-CM

## 2024-02-13 DIAGNOSIS — Z87.42 PERSONAL HISTORY OF OTHER DISEASES OF THE FEMALE GENITAL TRACT: ICD-10-CM

## 2024-02-13 DIAGNOSIS — R00.2 PALPITATIONS: ICD-10-CM

## 2024-02-13 DIAGNOSIS — E78.5 HYPERLIPIDEMIA, UNSPECIFIED: ICD-10-CM

## 2024-02-13 DIAGNOSIS — R51.9 HEADACHE, UNSPECIFIED: ICD-10-CM

## 2024-02-13 LAB
ALBUMIN SERPL ELPH-MCNC: 4 G/DL — SIGNIFICANT CHANGE UP (ref 3.3–5)
ALP SERPL-CCNC: 58 U/L — SIGNIFICANT CHANGE UP (ref 40–120)
ALT FLD-CCNC: 8 U/L — LOW (ref 10–45)
ANION GAP SERPL CALC-SCNC: 9 MMOL/L — SIGNIFICANT CHANGE UP (ref 5–17)
APPEARANCE UR: ABNORMAL
AST SERPL-CCNC: 18 U/L — SIGNIFICANT CHANGE UP (ref 10–40)
BACTERIA # UR AUTO: ABNORMAL /HPF
BASOPHILS # BLD AUTO: 0.03 K/UL — SIGNIFICANT CHANGE UP (ref 0–0.2)
BASOPHILS NFR BLD AUTO: 0.3 % — SIGNIFICANT CHANGE UP (ref 0–2)
BILIRUB SERPL-MCNC: 0.7 MG/DL — SIGNIFICANT CHANGE UP (ref 0.2–1.2)
BILIRUB UR-MCNC: NEGATIVE — SIGNIFICANT CHANGE UP
BUN SERPL-MCNC: 12 MG/DL — SIGNIFICANT CHANGE UP (ref 7–23)
CALCIUM SERPL-MCNC: 9.1 MG/DL — SIGNIFICANT CHANGE UP (ref 8.4–10.5)
CAST: 2 /LPF — SIGNIFICANT CHANGE UP (ref 0–4)
CHLORIDE SERPL-SCNC: 107 MMOL/L — SIGNIFICANT CHANGE UP (ref 96–108)
CO2 SERPL-SCNC: 19 MMOL/L — LOW (ref 22–31)
COLOR SPEC: SIGNIFICANT CHANGE UP
CREAT SERPL-MCNC: 0.68 MG/DL — SIGNIFICANT CHANGE UP (ref 0.5–1.3)
DIFF PNL FLD: ABNORMAL
EGFR: 109 ML/MIN/1.73M2 — SIGNIFICANT CHANGE UP
EOSINOPHIL # BLD AUTO: 0.26 K/UL — SIGNIFICANT CHANGE UP (ref 0–0.5)
EOSINOPHIL NFR BLD AUTO: 2.6 % — SIGNIFICANT CHANGE UP (ref 0–6)
GLUCOSE SERPL-MCNC: 101 MG/DL — HIGH (ref 70–99)
GLUCOSE UR QL: NEGATIVE MG/DL — SIGNIFICANT CHANGE UP
HCT VFR BLD CALC: 49.8 % — HIGH (ref 34.5–45)
HGB BLD-MCNC: 16 G/DL — HIGH (ref 11.5–15.5)
IMM GRANULOCYTES NFR BLD AUTO: 0.2 % — SIGNIFICANT CHANGE UP (ref 0–0.9)
KETONES UR-MCNC: ABNORMAL MG/DL
LEUKOCYTE ESTERASE UR-ACNC: NEGATIVE — SIGNIFICANT CHANGE UP
LYMPHOCYTES # BLD AUTO: 0.84 K/UL — LOW (ref 1–3.3)
LYMPHOCYTES # BLD AUTO: 8.5 % — LOW (ref 13–44)
MCHC RBC-ENTMCNC: 26.3 PG — LOW (ref 27–34)
MCHC RBC-ENTMCNC: 32.1 GM/DL — SIGNIFICANT CHANGE UP (ref 32–36)
MCV RBC AUTO: 81.9 FL — SIGNIFICANT CHANGE UP (ref 80–100)
MONOCYTES # BLD AUTO: 0.57 K/UL — SIGNIFICANT CHANGE UP (ref 0–0.9)
MONOCYTES NFR BLD AUTO: 5.8 % — SIGNIFICANT CHANGE UP (ref 2–14)
NEUTROPHILS # BLD AUTO: 8.11 K/UL — HIGH (ref 1.8–7.4)
NEUTROPHILS NFR BLD AUTO: 82.6 % — HIGH (ref 43–77)
NITRITE UR-MCNC: NEGATIVE — SIGNIFICANT CHANGE UP
NRBC # BLD: 0 /100 WBCS — SIGNIFICANT CHANGE UP (ref 0–0)
PH UR: 5 — SIGNIFICANT CHANGE UP (ref 5–8)
PLATELET # BLD AUTO: 245 K/UL — SIGNIFICANT CHANGE UP (ref 150–400)
POTASSIUM SERPL-MCNC: 4.7 MMOL/L — SIGNIFICANT CHANGE UP (ref 3.5–5.3)
POTASSIUM SERPL-SCNC: 4.7 MMOL/L — SIGNIFICANT CHANGE UP (ref 3.5–5.3)
PROT SERPL-MCNC: 7.6 G/DL — SIGNIFICANT CHANGE UP (ref 6–8.3)
PROT UR-MCNC: SIGNIFICANT CHANGE UP MG/DL
RBC # BLD: 6.08 M/UL — HIGH (ref 3.8–5.2)
RBC # FLD: 13.9 % — SIGNIFICANT CHANGE UP (ref 10.3–14.5)
RBC CASTS # UR COMP ASSIST: 7 /HPF — HIGH (ref 0–4)
SODIUM SERPL-SCNC: 135 MMOL/L — SIGNIFICANT CHANGE UP (ref 135–145)
SP GR SPEC: 1.02 — SIGNIFICANT CHANGE UP (ref 1–1.03)
SQUAMOUS # UR AUTO: >36 /HPF — HIGH (ref 0–5)
UROBILINOGEN FLD QL: 1 MG/DL — SIGNIFICANT CHANGE UP (ref 0.2–1)
WBC # BLD: 9.83 K/UL — SIGNIFICANT CHANGE UP (ref 3.8–10.5)
WBC # FLD AUTO: 9.83 K/UL — SIGNIFICANT CHANGE UP (ref 3.8–10.5)
WBC UR QL: 3 /HPF — SIGNIFICANT CHANGE UP (ref 0–5)

## 2024-02-13 PROCEDURE — 96374 THER/PROPH/DIAG INJ IV PUSH: CPT

## 2024-02-13 PROCEDURE — 93005 ELECTROCARDIOGRAM TRACING: CPT

## 2024-02-13 PROCEDURE — 80053 COMPREHEN METABOLIC PANEL: CPT

## 2024-02-13 PROCEDURE — 85025 COMPLETE CBC W/AUTO DIFF WBC: CPT

## 2024-02-13 PROCEDURE — 99284 EMERGENCY DEPT VISIT MOD MDM: CPT | Mod: 25

## 2024-02-13 PROCEDURE — 36415 COLL VENOUS BLD VENIPUNCTURE: CPT

## 2024-02-13 PROCEDURE — 96375 TX/PRO/DX INJ NEW DRUG ADDON: CPT

## 2024-02-13 PROCEDURE — 93010 ELECTROCARDIOGRAM REPORT: CPT

## 2024-02-13 PROCEDURE — 81001 URINALYSIS AUTO W/SCOPE: CPT

## 2024-02-13 RX ORDER — ONDANSETRON 8 MG/1
4 TABLET, FILM COATED ORAL ONCE
Refills: 0 | Status: COMPLETED | OUTPATIENT
Start: 2024-02-13 | End: 2024-02-13

## 2024-02-13 RX ORDER — ACETAMINOPHEN 500 MG
1000 TABLET ORAL ONCE
Refills: 0 | Status: COMPLETED | OUTPATIENT
Start: 2024-02-13 | End: 2024-02-13

## 2024-02-13 RX ORDER — ONDANSETRON 8 MG/1
1 TABLET, FILM COATED ORAL
Qty: 15 | Refills: 0
Start: 2024-02-13 | End: 2024-02-17

## 2024-02-13 RX ORDER — SODIUM CHLORIDE 9 MG/ML
1000 INJECTION INTRAMUSCULAR; INTRAVENOUS; SUBCUTANEOUS ONCE
Refills: 0 | Status: COMPLETED | OUTPATIENT
Start: 2024-02-13 | End: 2024-02-13

## 2024-02-13 RX ORDER — LOPERAMIDE HCL 2 MG
1 TABLET ORAL
Qty: 12 | Refills: 0
Start: 2024-02-13 | End: 2024-02-15

## 2024-02-13 RX ORDER — METOCLOPRAMIDE HCL 10 MG
10 TABLET ORAL ONCE
Refills: 0 | Status: COMPLETED | OUTPATIENT
Start: 2024-02-13 | End: 2024-02-13

## 2024-02-13 RX ADMIN — ONDANSETRON 4 MILLIGRAM(S): 8 TABLET, FILM COATED ORAL at 09:24

## 2024-02-13 RX ADMIN — SODIUM CHLORIDE 1000 MILLILITER(S): 9 INJECTION INTRAMUSCULAR; INTRAVENOUS; SUBCUTANEOUS at 10:42

## 2024-02-13 RX ADMIN — SODIUM CHLORIDE 1000 MILLILITER(S): 9 INJECTION INTRAMUSCULAR; INTRAVENOUS; SUBCUTANEOUS at 08:28

## 2024-02-13 RX ADMIN — Medication 104 MILLIGRAM(S): at 08:27

## 2024-02-13 RX ADMIN — Medication 400 MILLIGRAM(S): at 08:27

## 2024-02-13 NOTE — ED ADULT TRIAGE NOTE - CHIEF COMPLAINT QUOTE
Pt, with hx of asthma, hyperthyroidism and HLD, presents to ER c/o acute onset of N/V/D since ~0100. Pt denies any other associated symptoms at this time. Pt also reports episodic dizziness, palpitations and right arm "pins and needles" for ~1 1/2 months.

## 2024-02-13 NOTE — ED ADULT NURSE NOTE - OBJECTIVE STATEMENT
42 yo female c/o n/v/d. Pt reports sx started at 1 am and woke her from sleep. Endorses headache, chills x3 days. Denies chest pain, SOB, dizziness. AAOx4.

## 2024-02-13 NOTE — ED PROVIDER NOTE - NSFOLLOWUPINSTRUCTIONS_ED_ALL_ED_FT
Thank you for visiting [facility] Emergency Department.      We saw you today for vomiting and diarrhea.  You may try zofran as needed for nausea and loperamide for diarrhea.  Drink plenty of fluids and rest.    PAIN CONTROL:   You may take ibuprofen (Motrin, Advil) 600 mg (3 regular tablets) every 6 hours as needed for pain.  Please take with food.  Stop taking if you develop abdominal pain, dark/ bloody stools.  Do not mix with other NSAIDS (ie. Naproxen, Aleve, Celecoxib).  You may also take acetaminophen (Tylenol) 650-975mg (2-3 regular tablets) or 500-1000mg (1-2 extra strength tablets) every 6 hours as needed for pain.  Do not exceed 4000 mg in 1 day. These medications may be bought over the counter.    I recommend alternating the Ibuprofen and Tylenol so you are getting medications around the clock.  For example take the Ibuprofen, then 3 hours later take the Tylenol, then 3 hours later take the Ibuprofen, and repeat as needed.    Please know that no emergency visit is complete without follow-up with your primary care provider in 1 week.  Please bring copies of all discharge papers and results and show to your doctor.      Please continue taking all previous medications as instructed unless we discussed otherwise.     I appreciated your patience and hope you feel better soon.     Return to ER immediately if you develop fevers, chills, chest pain, shortness of breath, worsening pain, and/or any concerning symptoms.

## 2024-02-13 NOTE — ED PROVIDER NOTE - OBJECTIVE STATEMENT
44 y/o female w/ hx hypothyroidism, HLD, ectopic pregnancy, appendectomy, PID p/w multiple episodes N/V/D since 1am this morning.  Having trouble tolerating PO.  NBNB emesis and nonbloody diarrhea.  Also notes has been experiencing intermittent lightheadedness/dizziness, ha, palpitations, and tingling sensations moving throughout body x "long time," which she noticed yesterday.  Currently with generalized ha.  States has had imaging of head in past.  Per review of chart, pt had neg CTH, CTA head and neck in May 2023 at [facility] ED.  Notes chronic frequent urination, unchanged today.  Denies f/c, cough, rhinorrhea, sore throat, cp, sob, abd pain, dysuria, hematuria.  LMP 3 wks ago.

## 2024-02-13 NOTE — ED ADULT NURSE NOTE - CAS ELECT INFOMATION PROVIDED
pt requesting to leave.  500Ml of bolus infused, pt states she feels better refusing rpt VS exited ED safely, pa aware/DC instructions

## 2024-02-13 NOTE — ED PROVIDER NOTE - PATIENT PORTAL LINK FT
You can access the FollowMyHealth Patient Portal offered by [facility] by registering at the following website: [email or web address]. By joining [employer]’s FollowMyHealth portal, you will also be able to view your health information using other applications (apps) compatible with our system.

## 2024-02-13 NOTE — ED PROVIDER NOTE - CLINICAL SUMMARY MEDICAL DECISION MAKING FREE TEXT BOX
44 y/o female w/ hx hypothyroidism, HLD, ectopic pregnancy, appendectomy, PID p/w multiple episodes N/V/D since 1am this morning.  Having trouble tolerating PO.  NBNB emesis and nonbloody diarrhea.  Also notes has been experiencing intermittent lightheadedness/dizziness, ha, palpitations, and tingling sensations moving throughout body x "long time," which she noticed yesterday.  Currently with generalized ha.  States has had imaging of head in past.  Per review of chart, pt had neg CTH, CTA head and neck in May 2023 at [facility] ED.  Notes chronic frequent urination, unchanged today.  Denies f/c, cough, rhinorrhea, sore throat, cp, sob, abd pain, dysuria, hematuria.  LMP 3 wks ago.  VSS  Exam grossly unrevealing  Abd soft ntnd.  Neuro exam reassuring  Overall suspect likely viral gastro  Will get screening labs, ua, upt to eval for infx, electrolyte ab, uti, pregnancy  Doubt acute neurologic emergency.  Will get screening ekg for palpitations  Will hydrate, give antiemetics, and pain meds  Re-eval, po trial

## 2024-02-13 NOTE — ED PROVIDER NOTE - NS ED ROS FT
CONSTITUTIONAL: Denies fever and chills    RESPIRATORY: Denies SOB    CARDIOVASCULAR: Denies chest pain.    GASTROINTESTINAL: Denies abdominal pain.  + nausea, vomiting and diarrhea.    GENITOURINARY: Denies dysuria and hematuria.

## 2024-02-13 NOTE — ED PROVIDER NOTE - CCCP TRG CHIEF CMPLNT
[facility] [address]    3. ST elevation myocardial infarction involving right coronary artery ([facility] [address]    Jaquan Ruiz at [place], nausea, vomiting, diarrhea

## 2024-02-13 NOTE — ED PROVIDER NOTE - PHYSICAL EXAMINATION
CONSTITUTIONAL: Awake, alert.  Nontoxic, no acute distress.    HEAD: Normocephalic, atraumatic.    EYES: Conjunctivae clear without exudates or hemorrhage. Sclera is non-icteric.    ENT: Normal appearing external ears, nose, mucous membranes moist.    NECK: supple, trachea midline.    HEART:  Normal rate, regular rhythm.  Heart sounds S1, S2.  No murmurs, rubs or gallops.    LUNGS:  No acute respiratory distress.  Non-tachypneic and non-labored.  Lungs are clear bilaterally with good aeration.  No wheezing, rales, rhonchi.    ABDOMEN: Normal appearing skin without lesions, rashes.  Normal bowel sounds x 4.  Soft, non-distended, non-tender in all four quadrants. No rebound or guarding. No hernias or masses palpable.  No pulsatile abdominal mass.   No CVA tenderness b/l.    MUSCULOSKELETAL:  Moving all extremities without issue.    SKIN: Skin in warm, dry and intact without rashes or lesions.  Appropriate color for ethnicity.    NEUROLOGICAL:  Awake, alert and oriented x 3.  Normal speech and cognition.  Face symmetric with equal sensation to light touch throughout, PERRL, EOMI, no nystagmus, hearing grossly equal and intact b/l, no tongue deviation, intact strength upon turning head against resistance b/l, No gross motor deficit, 5/5 strength throughout, no gross sensory loss to light touch b/l upper and lower ext, normal movement.  No dysmetria on finger to nose testing.  Neg pronator drift.  Normal gait.      PSYCH: Appropriate mood and affect. Good judgment and insight.

## 2024-02-13 NOTE — ED PROVIDER NOTE - ATTENDING APP SHARED VISIT CONTRIBUTION OF CARE
43 F asthma hypothyroidism hyperlipidemia, PID in past, ectopic, appendectomy now with n/v/d since 1am with associated intermittent headaches and tingling present for months.  Had stroke workup for similar in recent past.  No weakness or changes to vision, speech or gait.  VSS.  Patient is alert, oriented x person, place and time.  Cranial nerves 2-12 are intact.  Normal gait and speech.  Cerebellar testing normal:  negative Romberg, normal coordination and normal finger to nose, heal to shin and rapid alternating movements.  Normal proprioception and sensory exam.  No pronator drift.  5/5 bl upper extremity and lower extremity strength.  Belly soft nontender.  Basic labs WNL.  UA w/o pyuria.  Likely gastro.  Pt tx and improved, tolerating PO.  Do not suspect surgical abd or cns issue.  IV and dc.

## 2024-03-25 DIAGNOSIS — N39.3 STRESS INCONTINENCE (FEMALE) (MALE): ICD-10-CM

## 2024-04-02 ENCOUNTER — APPOINTMENT (OUTPATIENT)
Dept: UROGYNECOLOGY | Facility: CLINIC | Age: 44
End: 2024-04-02
Payer: MEDICAID

## 2024-04-02 VITALS — SYSTOLIC BLOOD PRESSURE: 118 MMHG | OXYGEN SATURATION: 98 % | HEART RATE: 80 BPM | DIASTOLIC BLOOD PRESSURE: 80 MMHG

## 2024-04-02 PROCEDURE — 51701 INSERT BLADDER CATHETER: CPT

## 2024-04-02 PROCEDURE — 99204 OFFICE O/P NEW MOD 45 MIN: CPT | Mod: 25

## 2024-04-02 NOTE — PHYSICAL EXAM
[Chaperone Present] : A chaperone was present in the examining room during all aspects of the physical examination [No Acute Distress] : in no acute distress [Well developed] : well developed [Well Nourished] : ~L well nourished [Good Hygeine] : demonstrates good hygeine [Oriented x3] : oriented to person, place, and time [Normal Mood/Affect] : mood and affect are normal [Normal Memory] : ~T memory was ~L unimpaired [Anxiety] : patient is anxious [Normal Lung Sounds] : the lungs were clear to auscultation [Respirations regular] : ~T respiratory rate was regular [Cough] : cough [Rate & Rhythm Regular] : ~T heart rate and rhythm were normal [Mass (___ Cm)] : no ~M [unfilled] abdominal mass was palpated [Tenderness] : ~T no ~M abdominal tenderness observed [Distended] : not distended [H/Smegaly] : no hepatosplenomegaly [Hernia] : no hernia observed [No Lesions] : no lesions were seen on the external genitalia [Labia Majora] : were normal [Bartholin's Gland] : both Bartholin's glands were normal  [Normal Appearance] : general appearance was normal [Normal] : no abnormalities [Post Void Residual ____ml] : post void residual was [unfilled] ml [de-identified] : no [pop

## 2024-04-02 NOTE — HISTORY OF PRESENT ILLNESS
[FreeTextEntry1] :    The patient is a  43year P 5 svdwith complaints of oab   JEANNIE She denies any urinary leakage She feels complete bladder emptying She voids every 6__ DAY with ____ volume. 3 nocturia.  Her liquid intake consists of She has a BM qNL She denies gross hematuria, hx of nephrolithiasis, vaginal bulge or recurrent UTIs Fecal Inc Sexually active YES NO ISSUESN  Her last pap was genaro WEARS PADS The last time she has intercourse was  abdominal surgical history  PID TOA AJDN SALPINGECTOMY  ECTOPIC She has no history of bruising, excessive bleeding, peteciae. Coffie , water team juice  no etoh

## 2024-04-02 NOTE — PROCEDURE
[Straight Catheterization] : insertion of a straight catheter [Stress Incontinence] : stress incontinence [Patient] : the patient [None] : there were no complications with the catheter insertion [Culture] : culture [No Complications] : no complications [Tolerated Well] : the patient tolerated the procedure well [Post procedure instructions and information given] : Post procedure instructions and information were given and reviewed with patient.

## 2024-04-02 NOTE — ASSESSMENT
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 iuga info  given bladder diary  rtc for URD  consider  treatment depending on urd and bladder diary

## 2024-04-11 ENCOUNTER — APPOINTMENT (OUTPATIENT)
Dept: OBGYN | Facility: CLINIC | Age: 44
End: 2024-04-11
Payer: MEDICAID

## 2024-04-11 VITALS — BODY MASS INDEX: 31.22 KG/M2 | WEIGHT: 192 LBS | SYSTOLIC BLOOD PRESSURE: 110 MMHG | DIASTOLIC BLOOD PRESSURE: 70 MMHG

## 2024-04-11 DIAGNOSIS — Z12.4 ENCOUNTER FOR SCREENING FOR MALIGNANT NEOPLASM OF CERVIX: ICD-10-CM

## 2024-04-11 DIAGNOSIS — Z12.39 ENCOUNTER FOR OTHER SCREENING FOR MALIGNANT NEOPLASM OF BREAST: ICD-10-CM

## 2024-04-11 PROCEDURE — 99459 PELVIC EXAMINATION: CPT

## 2024-04-11 PROCEDURE — 99214 OFFICE O/P EST MOD 30 MIN: CPT

## 2024-04-11 NOTE — PHYSICAL EXAM
[Chaperone Present] : A chaperone was present in the examining room during all aspects of the physical examination [12637] : A chaperone was present during the pelvic exam. [Appropriately responsive] : appropriately responsive [Alert] : alert [No Acute Distress] : no acute distress [Soft] : soft [Non-tender] : non-tender [Non-distended] : non-distended [Oriented x3] : oriented x3 [FreeTextEntry7] : palpable lower abdominal left of midline ventral hernia [Labia Majora] : normal [Labia Minora] : normal [Scant] : There was scant vaginal bleeding [Normal] : normal [Tenderness] : tender [Enlarged ___ wks] : enlarged [unfilled] ~Uweeks [Uterine Adnexae] : non-palpable

## 2024-04-11 NOTE — HISTORY OF PRESENT ILLNESS
[FreeTextEntry1] : Patient presents to the office for a follow up visit re: pelvic MRI done 11/2023 and for routine pap smear (has been several years). Also of note, has not had a mammogram in 2yrs as she had been having at [facility] and was told it was q2yrs. Has seen a GI and is pending colonoscopy/endoscopy. She no longer has RLQ or LLQ pain since last seen here as she has greatly changed her dietary habits - cut of processed, high glycemic, high fat foods. She has seen a PCP as of last summer and was found to be pre-DM with high cholesterol/high LDL and was counseled then also on dietary modifications which she is again taking seriously. She has lost ~ 30lbs since last year. Mrs. Daniels has not been able to return until now 2/2 loss of her father, unexpectedly end of last year, from prostate CA that she did not know about until his death.  Known hx R hydrosalpinx ~ 4yrs now. Had f/u MRI 11/2023: showing R 2.9cm hematosalpinx - as above, pain resolved. Hx multiple abdominal/pelvic surgeries s/p Urogyn consultation 4/2 for OAB and has f/u for URD

## 2024-04-11 NOTE — PLAN
[FreeTextEntry1] : - pap smear collected today - mammogram/breast US referral placed - has f/u silva'd for colonoscopy - currently asymptomatic after drastic change in her diet to low-inflammatory foods and has found no pain in the area of her known R hydro/hematosalpinx - will observe for now  RTO for annual or earlier PRN

## 2024-04-11 NOTE — COUNSELING
[Lab Results] : lab results [Medication Management] : medication management [No] : Risk of tobacco use and health benefits of smoking cessation discussed: No

## 2024-04-12 LAB
HPV 16 E6+E7 MRNA CVX QL NAA+PROBE: NOT DETECTED
HPV HIGH+LOW RISK DNA PNL CVX: NOT DETECTED
HPV18+45 E6+E7 MRNA CVX QL NAA+PROBE: NOT DETECTED

## 2024-04-22 LAB — CYTOLOGY CVX/VAG DOC THIN PREP: NORMAL

## 2024-05-21 ENCOUNTER — APPOINTMENT (OUTPATIENT)
Dept: UROGYNECOLOGY | Facility: CLINIC | Age: 44
End: 2024-05-21

## 2024-07-11 ENCOUNTER — EMERGENCY (EMERGENCY)
Facility: HOSPITAL | Age: 44
LOS: 1 days | Discharge: HOME | End: 2024-07-11
Attending: EMERGENCY MEDICINE | Admitting: EMERGENCY MEDICINE
Payer: MEDICAID

## 2024-07-11 VITALS
OXYGEN SATURATION: 95 % | SYSTOLIC BLOOD PRESSURE: 116 MMHG | HEART RATE: 77 BPM | RESPIRATION RATE: 16 BRPM | TEMPERATURE: 98 F | DIASTOLIC BLOOD PRESSURE: 80 MMHG

## 2024-07-11 VITALS — HEART RATE: 71 BPM | DIASTOLIC BLOOD PRESSURE: 81 MMHG | SYSTOLIC BLOOD PRESSURE: 125 MMHG | TEMPERATURE: 98 F

## 2024-07-11 DIAGNOSIS — E78.5 HYPERLIPIDEMIA, UNSPECIFIED: ICD-10-CM

## 2024-07-11 DIAGNOSIS — Z87.59 PERSONAL HISTORY OF OTHER COMPLICATIONS OF PREGNANCY, CHILDBIRTH AND THE PUERPERIUM: ICD-10-CM

## 2024-07-11 DIAGNOSIS — J45.909 UNSPECIFIED ASTHMA, UNCOMPLICATED: ICD-10-CM

## 2024-07-11 DIAGNOSIS — N92.0 EXCESSIVE AND FREQUENT MENSTRUATION WITH REGULAR CYCLE: ICD-10-CM

## 2024-07-11 DIAGNOSIS — Z90.79 ACQUIRED ABSENCE OF OTHER GENITAL ORGAN(S): ICD-10-CM

## 2024-07-11 DIAGNOSIS — Z87.42 PERSONAL HISTORY OF OTHER DISEASES OF THE FEMALE GENITAL TRACT: ICD-10-CM

## 2024-07-11 DIAGNOSIS — N70.11 CHRONIC SALPINGITIS: ICD-10-CM

## 2024-07-11 DIAGNOSIS — R30.0 DYSURIA: ICD-10-CM

## 2024-07-11 DIAGNOSIS — Z88.0 ALLERGY STATUS TO PENICILLIN: ICD-10-CM

## 2024-07-11 DIAGNOSIS — E03.9 HYPOTHYROIDISM, UNSPECIFIED: ICD-10-CM

## 2024-07-11 DIAGNOSIS — Z90.79 ACQUIRED ABSENCE OF OTHER GENITAL ORGAN(S): Chronic | ICD-10-CM

## 2024-07-11 DIAGNOSIS — Z90.49 ACQUIRED ABSENCE OF OTHER SPECIFIED PARTS OF DIGESTIVE TRACT: ICD-10-CM

## 2024-07-11 LAB
ALBUMIN SERPL ELPH-MCNC: 3.8 G/DL — SIGNIFICANT CHANGE UP (ref 3.3–5)
ALP SERPL-CCNC: 66 U/L — SIGNIFICANT CHANGE UP (ref 40–120)
ALT FLD-CCNC: 9 U/L — LOW (ref 10–45)
ANION GAP SERPL CALC-SCNC: 11 MMOL/L — SIGNIFICANT CHANGE UP (ref 5–17)
APPEARANCE UR: ABNORMAL
APTT BLD: 34.1 SEC — SIGNIFICANT CHANGE UP (ref 24.5–35.6)
AST SERPL-CCNC: 16 U/L — SIGNIFICANT CHANGE UP (ref 10–40)
BACTERIA # UR AUTO: ABNORMAL /HPF
BASOPHILS # BLD AUTO: 0.04 K/UL — SIGNIFICANT CHANGE UP (ref 0–0.2)
BASOPHILS NFR BLD AUTO: 0.7 % — SIGNIFICANT CHANGE UP (ref 0–2)
BILIRUB SERPL-MCNC: 0.3 MG/DL — SIGNIFICANT CHANGE UP (ref 0.2–1.2)
BILIRUB UR-MCNC: NEGATIVE — SIGNIFICANT CHANGE UP
BLD GP AB SCN SERPL QL: NEGATIVE — SIGNIFICANT CHANGE UP
BUN SERPL-MCNC: 9 MG/DL — SIGNIFICANT CHANGE UP (ref 7–23)
CALCIUM SERPL-MCNC: 9 MG/DL — SIGNIFICANT CHANGE UP (ref 8.4–10.5)
CAST: 0 /LPF — SIGNIFICANT CHANGE UP (ref 0–4)
CHLORIDE SERPL-SCNC: 107 MMOL/L — SIGNIFICANT CHANGE UP (ref 96–108)
CO2 SERPL-SCNC: 20 MMOL/L — LOW (ref 22–31)
COLOR SPEC: YELLOW — SIGNIFICANT CHANGE UP
CREAT SERPL-MCNC: 0.72 MG/DL — SIGNIFICANT CHANGE UP (ref 0.5–1.3)
DIFF PNL FLD: ABNORMAL
EGFR: 105 ML/MIN/1.73M2 — SIGNIFICANT CHANGE UP
EGFR: 105 ML/MIN/1.73M2 — SIGNIFICANT CHANGE UP
EOSINOPHIL # BLD AUTO: 0.4 K/UL — SIGNIFICANT CHANGE UP (ref 0–0.5)
EOSINOPHIL NFR BLD AUTO: 7.4 % — HIGH (ref 0–6)
GLUCOSE SERPL-MCNC: 104 MG/DL — HIGH (ref 70–99)
GLUCOSE UR QL: NEGATIVE MG/DL — SIGNIFICANT CHANGE UP
HCG SERPL-ACNC: <1 MIU/ML — SIGNIFICANT CHANGE UP
HCT VFR BLD CALC: 46.9 % — HIGH (ref 34.5–45)
HGB BLD-MCNC: 15 G/DL — SIGNIFICANT CHANGE UP (ref 11.5–15.5)
IMM GRANULOCYTES NFR BLD AUTO: 0.4 % — SIGNIFICANT CHANGE UP (ref 0–0.9)
INR BLD: 0.96 — SIGNIFICANT CHANGE UP (ref 0.85–1.18)
KETONES UR-MCNC: NEGATIVE MG/DL — SIGNIFICANT CHANGE UP
LEUKOCYTE ESTERASE UR-ACNC: NEGATIVE — SIGNIFICANT CHANGE UP
LYMPHOCYTES # BLD AUTO: 1.32 K/UL — SIGNIFICANT CHANGE UP (ref 1–3.3)
LYMPHOCYTES # BLD AUTO: 24.5 % — SIGNIFICANT CHANGE UP (ref 13–44)
MCHC RBC-ENTMCNC: 26.6 PG — LOW (ref 27–34)
MCHC RBC-ENTMCNC: 32 GM/DL — SIGNIFICANT CHANGE UP (ref 32–36)
MCV RBC AUTO: 83.2 FL — SIGNIFICANT CHANGE UP (ref 80–100)
MONOCYTES # BLD AUTO: 0.58 K/UL — SIGNIFICANT CHANGE UP (ref 0–0.9)
MONOCYTES NFR BLD AUTO: 10.8 % — SIGNIFICANT CHANGE UP (ref 2–14)
NEUTROPHILS # BLD AUTO: 3.03 K/UL — SIGNIFICANT CHANGE UP (ref 1.8–7.4)
NEUTROPHILS NFR BLD AUTO: 56.2 % — SIGNIFICANT CHANGE UP (ref 43–77)
NITRITE UR-MCNC: NEGATIVE — SIGNIFICANT CHANGE UP
NRBC # BLD: 0 /100 WBCS — SIGNIFICANT CHANGE UP (ref 0–0)
NRBC BLD-RTO: 0 /100 WBCS — SIGNIFICANT CHANGE UP (ref 0–0)
PH UR: 5.5 — SIGNIFICANT CHANGE UP (ref 5–8)
PLATELET # BLD AUTO: 227 K/UL — SIGNIFICANT CHANGE UP (ref 150–400)
POTASSIUM SERPL-MCNC: 4.5 MMOL/L — SIGNIFICANT CHANGE UP (ref 3.5–5.3)
POTASSIUM SERPL-SCNC: 4.5 MMOL/L — SIGNIFICANT CHANGE UP (ref 3.5–5.3)
PROT SERPL-MCNC: 7.5 G/DL — SIGNIFICANT CHANGE UP (ref 6–8.3)
PROT UR-MCNC: 30 MG/DL
PROTHROM AB SERPL-ACNC: 11 SEC — SIGNIFICANT CHANGE UP (ref 9.5–13)
RBC # BLD: 5.64 M/UL — HIGH (ref 3.8–5.2)
RBC # FLD: 13.6 % — SIGNIFICANT CHANGE UP (ref 10.3–14.5)
RBC CASTS # UR COMP ASSIST: 222 /HPF — HIGH (ref 0–4)
RH IG SCN BLD-IMP: POSITIVE — SIGNIFICANT CHANGE UP
SODIUM SERPL-SCNC: 138 MMOL/L — SIGNIFICANT CHANGE UP (ref 135–145)
SP GR SPEC: 1.02 — SIGNIFICANT CHANGE UP (ref 1–1.03)
SQUAMOUS # UR AUTO: 14 /HPF — HIGH (ref 0–5)
UROBILINOGEN FLD QL: 1 MG/DL — SIGNIFICANT CHANGE UP (ref 0.2–1)
WBC # BLD: 5.39 K/UL — SIGNIFICANT CHANGE UP (ref 3.8–10.5)
WBC # FLD AUTO: 5.39 K/UL — SIGNIFICANT CHANGE UP (ref 3.8–10.5)
WBC UR QL: 7 /HPF — HIGH (ref 0–5)

## 2024-07-11 PROCEDURE — 96374 THER/PROPH/DIAG INJ IV PUSH: CPT

## 2024-07-11 PROCEDURE — 76856 US EXAM PELVIC COMPLETE: CPT | Mod: 26

## 2024-07-11 PROCEDURE — 76856 US EXAM PELVIC COMPLETE: CPT

## 2024-07-11 PROCEDURE — 76830 TRANSVAGINAL US NON-OB: CPT

## 2024-07-11 PROCEDURE — 85610 PROTHROMBIN TIME: CPT

## 2024-07-11 PROCEDURE — 85025 COMPLETE CBC W/AUTO DIFF WBC: CPT

## 2024-07-11 PROCEDURE — 36415 COLL VENOUS BLD VENIPUNCTURE: CPT

## 2024-07-11 PROCEDURE — 99284 EMERGENCY DEPT VISIT MOD MDM: CPT | Mod: 25

## 2024-07-11 PROCEDURE — 86901 BLOOD TYPING SEROLOGIC RH(D): CPT

## 2024-07-11 PROCEDURE — 86850 RBC ANTIBODY SCREEN: CPT

## 2024-07-11 PROCEDURE — 86900 BLOOD TYPING SEROLOGIC ABO: CPT

## 2024-07-11 PROCEDURE — 84702 CHORIONIC GONADOTROPIN TEST: CPT

## 2024-07-11 PROCEDURE — 80053 COMPREHEN METABOLIC PANEL: CPT

## 2024-07-11 PROCEDURE — 81001 URINALYSIS AUTO W/SCOPE: CPT

## 2024-07-11 PROCEDURE — 76830 TRANSVAGINAL US NON-OB: CPT | Mod: 26

## 2024-07-11 PROCEDURE — 85730 THROMBOPLASTIN TIME PARTIAL: CPT

## 2024-07-11 PROCEDURE — 96375 TX/PRO/DX INJ NEW DRUG ADDON: CPT

## 2024-07-11 RX ORDER — ONDANSETRON HCL/PF 4 MG/2 ML
1 VIAL (ML) INJECTION
Qty: 1 | Refills: 0
Start: 2024-07-11 | End: 2024-07-14

## 2024-07-11 RX ORDER — ACETAMINOPHEN 500 MG/5ML
2 LIQUID (ML) ORAL
Qty: 40 | Refills: 0
Start: 2024-07-11 | End: 2024-07-15

## 2024-07-11 RX ORDER — ONDANSETRON HCL/PF 4 MG/2 ML
4 VIAL (ML) INJECTION ONCE
Refills: 0 | Status: COMPLETED | OUTPATIENT
Start: 2024-07-11 | End: 2024-07-11

## 2024-07-11 RX ORDER — IBUPROFEN 200 MG
1 TABLET ORAL
Qty: 20 | Refills: 0
Start: 2024-07-11 | End: 2024-07-15

## 2024-07-11 RX ORDER — KETOROLAC TROMETHAMINE 30 MG/ML
15 INJECTION, SOLUTION INTRAMUSCULAR; INTRAVENOUS ONCE
Refills: 0 | Status: DISCONTINUED | OUTPATIENT
Start: 2024-07-11 | End: 2024-07-11

## 2024-07-11 RX ADMIN — KETOROLAC TROMETHAMINE 15 MILLIGRAM(S): 30 INJECTION, SOLUTION INTRAMUSCULAR; INTRAVENOUS at 07:59

## 2024-07-11 RX ADMIN — Medication 4 MILLIGRAM(S): at 08:01

## 2024-07-11 RX ADMIN — Medication 1000 MILLILITER(S): at 07:59

## 2024-09-16 ENCOUNTER — APPOINTMENT (OUTPATIENT)
Dept: OBGYN | Facility: CLINIC | Age: 44
End: 2024-09-16

## 2024-10-01 ENCOUNTER — EMERGENCY (EMERGENCY)
Facility: HOSPITAL | Age: 44
LOS: 1 days | Discharge: HOME | End: 2024-10-01
Attending: EMERGENCY MEDICINE | Admitting: EMERGENCY MEDICINE
Payer: MEDICAID

## 2024-10-01 VITALS
WEIGHT: 203.05 LBS | HEIGHT: 67 IN | DIASTOLIC BLOOD PRESSURE: 85 MMHG | HEART RATE: 74 BPM | SYSTOLIC BLOOD PRESSURE: 118 MMHG | OXYGEN SATURATION: 98 % | TEMPERATURE: 98 F | RESPIRATION RATE: 16 BRPM

## 2024-10-01 VITALS
TEMPERATURE: 98 F | RESPIRATION RATE: 16 BRPM | HEART RATE: 68 BPM | DIASTOLIC BLOOD PRESSURE: 79 MMHG | SYSTOLIC BLOOD PRESSURE: 121 MMHG | OXYGEN SATURATION: 100 %

## 2024-10-01 DIAGNOSIS — Z90.79 ACQUIRED ABSENCE OF OTHER GENITAL ORGAN(S): Chronic | ICD-10-CM

## 2024-10-01 LAB
ANION GAP SERPL CALC-SCNC: 8 MMOL/L — SIGNIFICANT CHANGE UP (ref 5–17)
BASOPHILS # BLD AUTO: 0.04 K/UL — SIGNIFICANT CHANGE UP (ref 0–0.2)
BASOPHILS NFR BLD AUTO: 0.7 % — SIGNIFICANT CHANGE UP (ref 0–2)
BUN SERPL-MCNC: 10 MG/DL — SIGNIFICANT CHANGE UP (ref 7–23)
CALCIUM SERPL-MCNC: 8.8 MG/DL — SIGNIFICANT CHANGE UP (ref 8.4–10.5)
CHLORIDE SERPL-SCNC: 107 MMOL/L — SIGNIFICANT CHANGE UP (ref 96–108)
CO2 SERPL-SCNC: 24 MMOL/L — SIGNIFICANT CHANGE UP (ref 22–31)
CREAT SERPL-MCNC: 0.77 MG/DL — SIGNIFICANT CHANGE UP (ref 0.5–1.3)
EGFR: 96 ML/MIN/1.73M2 — SIGNIFICANT CHANGE UP
EOSINOPHIL # BLD AUTO: 0.28 K/UL — SIGNIFICANT CHANGE UP (ref 0–0.5)
EOSINOPHIL NFR BLD AUTO: 4.7 % — SIGNIFICANT CHANGE UP (ref 0–6)
GLUCOSE SERPL-MCNC: 92 MG/DL — SIGNIFICANT CHANGE UP (ref 70–99)
HCT VFR BLD CALC: 40.8 % — SIGNIFICANT CHANGE UP (ref 34.5–45)
HGB BLD-MCNC: 12.8 G/DL — SIGNIFICANT CHANGE UP (ref 11.5–15.5)
IMM GRANULOCYTES NFR BLD AUTO: 0.3 % — SIGNIFICANT CHANGE UP (ref 0–0.9)
LYMPHOCYTES # BLD AUTO: 2.22 K/UL — SIGNIFICANT CHANGE UP (ref 1–3.3)
LYMPHOCYTES # BLD AUTO: 36.9 % — SIGNIFICANT CHANGE UP (ref 13–44)
MCHC RBC-ENTMCNC: 25.5 PG — LOW (ref 27–34)
MCHC RBC-ENTMCNC: 31.4 GM/DL — LOW (ref 32–36)
MCV RBC AUTO: 81.3 FL — SIGNIFICANT CHANGE UP (ref 80–100)
MONOCYTES # BLD AUTO: 0.45 K/UL — SIGNIFICANT CHANGE UP (ref 0–0.9)
MONOCYTES NFR BLD AUTO: 7.5 % — SIGNIFICANT CHANGE UP (ref 2–14)
NEUTROPHILS # BLD AUTO: 3 K/UL — SIGNIFICANT CHANGE UP (ref 1.8–7.4)
NEUTROPHILS NFR BLD AUTO: 49.9 % — SIGNIFICANT CHANGE UP (ref 43–77)
NRBC # BLD: 0 /100 WBCS — SIGNIFICANT CHANGE UP (ref 0–0)
PLATELET # BLD AUTO: 235 K/UL — SIGNIFICANT CHANGE UP (ref 150–400)
POTASSIUM SERPL-MCNC: 4.1 MMOL/L — SIGNIFICANT CHANGE UP (ref 3.5–5.3)
POTASSIUM SERPL-SCNC: 4.1 MMOL/L — SIGNIFICANT CHANGE UP (ref 3.5–5.3)
RBC # BLD: 5.02 M/UL — SIGNIFICANT CHANGE UP (ref 3.8–5.2)
RBC # FLD: 13.9 % — SIGNIFICANT CHANGE UP (ref 10.3–14.5)
SODIUM SERPL-SCNC: 139 MMOL/L — SIGNIFICANT CHANGE UP (ref 135–145)
WBC # BLD: 6.01 K/UL — SIGNIFICANT CHANGE UP (ref 3.8–10.5)
WBC # FLD AUTO: 6.01 K/UL — SIGNIFICANT CHANGE UP (ref 3.8–10.5)

## 2024-10-01 PROCEDURE — 99283 EMERGENCY DEPT VISIT LOW MDM: CPT | Mod: 25

## 2024-10-01 PROCEDURE — 99284 EMERGENCY DEPT VISIT MOD MDM: CPT

## 2024-10-01 PROCEDURE — 82962 GLUCOSE BLOOD TEST: CPT

## 2024-10-01 PROCEDURE — 85025 COMPLETE CBC W/AUTO DIFF WBC: CPT

## 2024-10-01 PROCEDURE — 80048 BASIC METABOLIC PNL TOTAL CA: CPT

## 2024-10-01 PROCEDURE — 36415 COLL VENOUS BLD VENIPUNCTURE: CPT

## 2024-10-01 RX ORDER — MECLIZINE HYDROCHLORIDE 25 MG/1
1 TABLET ORAL
Qty: 15 | Refills: 0
Start: 2024-10-01

## 2024-10-01 RX ORDER — MECLIZINE HYDROCHLORIDE 25 MG/1
25 TABLET ORAL ONCE
Refills: 0 | Status: COMPLETED | OUTPATIENT
Start: 2024-10-01 | End: 2024-10-01

## 2024-10-01 RX ADMIN — MECLIZINE HYDROCHLORIDE 25 MILLIGRAM(S): 25 TABLET ORAL at 09:35

## 2024-10-01 NOTE — ED ADULT NURSE NOTE - CAS TRG GENERAL NORM CIRC DET
Hector Castro  NEUROLOGY  [address] [place] [address], [address] [place] [address]  Phone: [phone]  Fax: [phone]  Follow Up Time:    Strong peripheral pulses

## 2024-10-01 NOTE — ED PROVIDER NOTE - ATTENDING APP SHARED VISIT CONTRIBUTION OF CARE
45 y/o f presents c/o intermittent dizziness over the past 3 years.  Pt stating her symptoms usually come with sudden position change or turning of her head, feels the room spinning, unsteady on her feet.  Pt stating she has been to several EDs for this, has been to [facility] in the past  and had negative head CTs, told to follow up with neuro but reports nobody reached out to set up and appt and didn't do it on her own.  Pt stating she wasn't prescribed any medication for this either.  Denies headache, fever, chills, CP, SOB, all other ROS negative.    No neuro deficits on exam in ED, sx consistent with peripheral vertigo and improved with meclizine in ED.  Pt has had neg CT head and CTA head/neck in 2023 for similar sx, no indication for repeat CT at this time.  Will d/c, recommend f/u with pmd, neuro, return to ED if sx worsen.    Addendum to attending statement: I have reviewed the ACP note and agree with the history, exam, and plan of care. I  was available to PA   as a supervising provider if needed. PA given opportunity to ask questions and request further evaluation / care.    Pt with baseline vertigo - appears peripheral as it is worse with position and pt has had in the pas  No other neuro deficits in ED  feels better with meclizine  Understands dc and follow up

## 2024-10-01 NOTE — ED ADULT NURSE NOTE - OBJECTIVE STATEMENT
Pt is 44 y.o female pt came in c/o feeling dizzy and lightheaded x months with tingling on the part. Pt denies weakness or numbness/tingling to one side, unsteady gait or slurred speech. Pt is awake and alert and conversive in full sentences and ambulatory. Pt reports has seen her PCP and reports could be pre DM and stress. Pt has hx of hypothyroid has not  been taking her meds x years. IV inserted and labs drawn. FS and EKG in prog. Assessment ongoing.

## 2024-10-01 NOTE — ED PROVIDER NOTE - OBJECTIVE STATEMENT
45 y/o f presents c/o intermittent dizziness over the past 3 years.  Pt stating her symptoms usually come with sudden position change or turning of her head, feels the room spinning, unsteady on her feet.  Pt stating she has been to several EDs for this, has been to [facility] in the past  and had negative head CTs, told to follow up with neuro but reports nobody reached out to set up and appt and didn't do it on her own.  Pt stating she wasn't prescribed any medication for this either.  Denies headache, fever, chills, CP, SOB, all other ROS negative.

## 2024-10-01 NOTE — ED ADULT TRIAGE NOTE - CHIEF COMPLAINT QUOTE
Pt BIBEMS from home for lightheadedness and 1 episode of vomiting x 12am. PMH anxiety. ekg, f/s in progress. EMS f/s 112.

## 2024-10-01 NOTE — ED PROVIDER NOTE - CLINICAL SUMMARY MEDICAL DECISION MAKING FREE TEXT BOX
45 y/o f presents c/o intermittent dizziness over the past 3 years.  No neuro deficits on exam in ED, sx consistent with peripheral vertigo and improved with meclizine in ED.  Pt has had neg CT head and CTA head/neck in 2023 for similar sx, no indication for repeat CT at this time.  Will d/c, recommend f/u with pmd, neuro, return to ED if sx worsen.

## 2024-10-01 NOTE — ED ADULT NURSE NOTE - NSFALLUNIVINTERV_ED_ALL_ED
Bed/Stretcher in lowest position, wheels locked, appropriate side rails in place/Call bell, personal items and telephone in reach/Instruct patient to call for assistance before getting out of bed/chair/stretcher/Non-slip footwear applied when patient is off stretcher/[place] to call system/Physically safe environment - no spills, clutter or unnecessary equipment/Purposeful proactive rounding/Room/bathroom lighting operational, light cord in reach

## 2024-10-04 DIAGNOSIS — Z88.0 ALLERGY STATUS TO PENICILLIN: ICD-10-CM

## 2024-10-04 DIAGNOSIS — R26.81 UNSTEADINESS ON FEET: ICD-10-CM

## 2024-10-04 DIAGNOSIS — R42 DIZZINESS AND GIDDINESS: ICD-10-CM
